# Patient Record
Sex: MALE | Race: WHITE | NOT HISPANIC OR LATINO | Employment: FULL TIME | URBAN - METROPOLITAN AREA
[De-identification: names, ages, dates, MRNs, and addresses within clinical notes are randomized per-mention and may not be internally consistent; named-entity substitution may affect disease eponyms.]

---

## 2018-09-28 ASSESSMENT — MIFFLIN-ST. JEOR: SCORE: 2346.94

## 2018-10-05 ENCOUNTER — SURGERY - HEALTHEAST (OUTPATIENT)
Dept: SURGERY | Facility: CLINIC | Age: 33
End: 2018-10-05

## 2018-10-05 ENCOUNTER — ANESTHESIA - HEALTHEAST (OUTPATIENT)
Dept: SURGERY | Facility: CLINIC | Age: 33
End: 2018-10-05

## 2018-10-05 ASSESSMENT — MIFFLIN-ST. JEOR
SCORE: 2287.97
SCORE: 2287.97

## 2019-12-18 ENCOUNTER — RECORDS - HEALTHEAST (OUTPATIENT)
Dept: ADMINISTRATIVE | Facility: OTHER | Age: 34
End: 2019-12-18

## 2019-12-19 ENCOUNTER — RECORDS - HEALTHEAST (OUTPATIENT)
Dept: ADMINISTRATIVE | Facility: OTHER | Age: 34
End: 2019-12-19

## 2020-01-19 ENCOUNTER — OFFICE VISIT (OUTPATIENT)
Dept: FAMILY MEDICINE | Facility: CLINIC | Age: 35
End: 2020-01-19
Payer: MEDICAID

## 2020-01-19 ENCOUNTER — HOSPITAL ENCOUNTER (EMERGENCY)
Facility: CLINIC | Age: 35
Discharge: HOME | End: 2020-01-19
Attending: NURSE PRACTITIONER | Admitting: NURSE PRACTITIONER
Payer: MEDICAID

## 2020-01-19 VITALS
TEMPERATURE: 98.7 F | OXYGEN SATURATION: 100 % | DIASTOLIC BLOOD PRESSURE: 90 MMHG | WEIGHT: 248 LBS | HEIGHT: 64 IN | SYSTOLIC BLOOD PRESSURE: 120 MMHG | RESPIRATION RATE: 12 BRPM | HEART RATE: 91 BPM | BODY MASS INDEX: 42.34 KG/M2

## 2020-01-19 VITALS
DIASTOLIC BLOOD PRESSURE: 96 MMHG | BODY MASS INDEX: 33.6 KG/M2 | HEIGHT: 71 IN | TEMPERATURE: 98.1 F | SYSTOLIC BLOOD PRESSURE: 143 MMHG | WEIGHT: 240 LBS | HEART RATE: 85 BPM | OXYGEN SATURATION: 99 % | RESPIRATION RATE: 18 BRPM

## 2020-01-19 DIAGNOSIS — E66.01 MORBID OBESITY (H): ICD-10-CM

## 2020-01-19 DIAGNOSIS — S05.02XA ABRASION OF LEFT CORNEA, INITIAL ENCOUNTER: ICD-10-CM

## 2020-01-19 DIAGNOSIS — H57.12 LEFT EYE PAIN: Primary | ICD-10-CM

## 2020-01-19 PROCEDURE — 90471 IMMUNIZATION ADMIN: CPT

## 2020-01-19 PROCEDURE — 99283 EMERGENCY DEPT VISIT LOW MDM: CPT | Mod: 25

## 2020-01-19 PROCEDURE — 99203 OFFICE O/P NEW LOW 30 MIN: CPT | Performed by: PHYSICIAN ASSISTANT

## 2020-01-19 PROCEDURE — 90715 TDAP VACCINE 7 YRS/> IM: CPT | Performed by: NURSE PRACTITIONER

## 2020-01-19 PROCEDURE — 25000125 ZZHC RX 250: Performed by: NURSE PRACTITIONER

## 2020-01-19 PROCEDURE — 25000128 H RX IP 250 OP 636: Performed by: NURSE PRACTITIONER

## 2020-01-19 RX ORDER — PROPARACAINE HYDROCHLORIDE 5 MG/ML
1 SOLUTION/ DROPS OPHTHALMIC ONCE
Status: COMPLETED | OUTPATIENT
Start: 2020-01-19 | End: 2020-01-19

## 2020-01-19 RX ORDER — ERYTHROMYCIN 5 MG/G
0.5 OINTMENT OPHTHALMIC 4 TIMES DAILY
Qty: 1 TUBE | Refills: 0 | Status: SHIPPED | OUTPATIENT
Start: 2020-01-19 | End: 2020-01-24

## 2020-01-19 RX ADMIN — PROPARACAINE HYDROCHLORIDE 1 DROP: 5 SOLUTION/ DROPS OPHTHALMIC at 16:26

## 2020-01-19 RX ADMIN — CLOSTRIDIUM TETANI TOXOID ANTIGEN (FORMALDEHYDE INACTIVATED), CORYNEBACTERIUM DIPHTHERIAE TOXOID ANTIGEN (FORMALDEHYDE INACTIVATED), BORDETELLA PERTUSSIS TOXOID ANTIGEN (GLUTARALDEHYDE INACTIVATED), BORDETELLA PERTUSSIS FILAMENTOUS HEMAGGLUTININ ANTIGEN (FORMALDEHYDE INACTIVATED), BORDETELLA PERTUSSIS PERTACTIN ANTIGEN, AND BORDETELLA PERTUSSIS FIMBRIAE 2/3 ANTIGEN 0.5 ML: 5; 2; 2.5; 5; 3; 5 INJECTION, SUSPENSION INTRAMUSCULAR at 16:27

## 2020-01-19 RX ADMIN — FLUORESCEIN SODIUM 1 STRIP: 1 STRIP OPHTHALMIC at 16:26

## 2020-01-19 ASSESSMENT — MIFFLIN-ST. JEOR
SCORE: 1970.92
SCORE: 2045.76

## 2020-01-19 ASSESSMENT — ENCOUNTER SYMPTOMS: EYE PAIN: 1

## 2020-01-19 NOTE — ED TRIAGE NOTES
Woke up with left eye swelling and pain, vision is blurred, see visual acuity in chart. Most tender on the upper eyelid

## 2020-01-19 NOTE — ED PROVIDER NOTES
"  History     Chief Complaint:  Eye swelling      HPI   Ede Wu is a 35 year old male who presents to the emergency department for evaluation of eye pain. The patient reports that he woke up today with left eye pain and the sensation that something is stuck in his eye. He does not wear contacts but wears glasses and last had a normal eye exam a few months ago. He went to bed normal last night without pain but does remember rubbing his eye. No recent trauma. His last tetanus was 2008.     Allergies:  Haldol     Medications:    Albuterol  Tenormin  Cogentin  Clozaril  Valium  Emgel  Lexapro  Valium  Flonase  Claritin  Perphenazine  MiraLAX  Tetracycline  Topamax  Geodon     Past Medical History:    Acne  Alcohol abuse  Allergic rhinitis  Anxiety  Depression  GERD  Personality disorder  Schizoaffective disorder  Sleep apnea  Snoring   Obesity     Past Surgical History:    Nasal reconstruction  Septoplasty     Family History:    Cancer, unknown    Social History:  Tobacco Use: Some day smoker, quit: 9/30/2005  Alcohol Use: No  PCP: Stiven Arnett  Marital Status:  Unknown      Review of Systems   Eyes: Positive for pain.   All other systems reviewed and are negative.    Physical Exam     Patient Vitals for the past 24 hrs:   BP Temp Temp src Pulse Resp SpO2 Height Weight   01/19/20 1552 (!) 143/96 98.1  F (36.7  C) Oral 85 18 99 % 1.803 m (5' 11\") 108.9 kg (240 lb)     Physical Exam  Nursing notes reviewed. Vitals reviewed.  General: Alert. Well kept.  Eyes:  Visual acuity: right eye - 20/30; left eye - 20/100.  IOP: left eye - 13  right eye: conjunctiva non-injected, non-icteric.  Left eye: Normal conjunctiva. No subconjunctival hemorrhage. No scleral icterus. No foreign body noted on upper eyelid eversion. On slit lamp exam - anterior chamber clear, no hyphema. no foreign body noted in cornea or conjunctiva. Negative Jessika sign. Corneal abrasion at the 7:00 position, does not extend into the central cornea. No " corneal ulcer. No dendrites.   Proptosis-absent  Ptosis-absent  Anisocoria- absent  Neck/Throat: Moist mucous membranes. Normal voice.  Cardiac: Regular rhythm. Normal heart sounds.  Pulmonary: Clear and equal breath sounds bilaterally.   Musculoskeletal: Normal gross range of motion of all 4 extremities.   Neurological: Alert and oriented x4.   Skin: Warm and dry. Normal appearance of visualized exposed skin without rashes or petechiae.  Psych: Affect normal. Good eye contact.    Emergency Department Course   Interventions:  1626 Alcaine 0.5% 1 drop left eye   1626 Ful-Rani 1 strip left eye  1627 Tdap 0.5 mLs IM    Emergency Department Course:  1613 Nursing notes and vitals reviewed. I performed an exam of the patient as documented above.     Medicine administered as documented above.     Findings and plan explained to the Patient. Patient discharged home with instructions regarding supportive care, medications, and reasons to return. The importance of close follow-up was reviewed. The patient was prescribed Romycin.     Impression & Plan    Medical Decision Making:  Ede Wu is a 35 year old male who presents with eye discomfort. The exam is significant for abnormality on fluorescein exam. This is consistent with corneal abrasion. No foreign bodies in eyes or lids noted.  No corneal ulcers. No signs of retinal abnormalities, open globe, acute glaucoma, or other serious eye disease.  No definite signs of anterior chamber involvement such as endopthalmitis at this point.  Will prescribe Romycin. Recommended to follow up with eye clinic and return if worsens.    Diagnosis:    ICD-10-CM    1. Abrasion of left cornea, initial encounter S05.02XA        Disposition:  Discharged to home    Discharge Medications:  New Prescriptions    ERYTHROMYCIN (ROMYCIN) 5 MG/GM OPHTHALMIC OINTMENT    Place 0.5 inches Into the left eye 4 times daily for 5 days     Scribe Disclosure:  Jerald VARGAS, am serving as a scribe on  1/19/2020 at 4:13 PM to personally document services performed by  Bre Rock CNP based on my observations and the provider's statements to me.     Jerald Soliman  1/19/2020    EMERGENCY DEPARTMENT       Bre Rock CNP  01/19/20 2016

## 2020-01-19 NOTE — PROGRESS NOTES
SUBJECTIVE:  Ede is a 35 year old male who presents to urgent care with left eye pain.  He woke up this morning with the pain and feels like there is a foreign body there.  He has difficulty opening up his eye.  He is unsure at the moment if there is any change in his visual acuity because he wears glasses.  He denies photophobia.  His eyes are red.  He denies discharge he denies knowing any specific foreign object that is in there at the moment.  He denies any headache or facial trauma.    Chief Complaint   Patient presents with     Eye Pain     Left eye. Wake up this morning wiht pain in the left eye.      ROS: see HPI    Current Outpatient Medications   Medication     ALBUTEROL IN     atenolol (TENORMIN) 50 MG tablet     benztropine (COGENTIN) 1 MG tablet     clozapine (CLOZARIL) 100 MG tablet     diazepam (VALIUM) 10 MG tablet     erythromycin with ethanol (EMGEL) 2 % gel     escitalopram (LEXAPRO) 20 MG tablet     esomeprazole (NEXIUM) 40 MG capsule     fluticasone (FLONASE) 50 MCG/ACT nasal spray     FLUTICASONE PROPIONATE  HFA IN     loratadine (CLARITIN) 10 MG tablet     perphenazine 4 MG tablet     POLYETHYLENE GLYCOL     tetracycline (ACHROMYCIN,SUMYCIN) 250 MG capsule     topiramate (TOPAMAX) 100 MG tablet     topiramate (TOPAMAX) 25 MG tablet     ziprasidone (GEODON) 40 MG capsule     ziprasidone (GEODON) 80 MG capsule     No current facility-administered medications for this visit.       Patient Active Problem List   Diagnosis     Nasal obstruction        Past Medical History:   Diagnosis Date     Acne hypertension     Alcohol abuse      Allergic rhinitis      Allergic rhinitis, cause unspecified      Anxiety      Broken nose      Depression      Difficulty breathing      GERD (gastroesophageal reflux disease)      Heartburn      Personality disorder (H)     borderline narcissistic and antisocial traits     Schizoaffective disorder     bipolor type     Sleep apnea      Sneezing      Snoring       "Stuffy nose      Weakness      Past Surgical History:   Procedure Laterality Date     nasal reconstruction  2004    broken nose     SEPTOPLASTY  2006, 2008    deviated septum     Family History   Problem Relation Age of Onset     Cancer Unknown         gradfather     Social History     Tobacco Use     Smoking status: Current Some Day Smoker     Last attempt to quit: 2005     Years since quittin.3     Smokeless tobacco: Never Used   Substance Use Topics     Alcohol use: No        OBJECTIVE:  BP (!) 120/90   Pulse 91   Temp 98.7  F (37.1  C) (Oral)   Resp 12   Ht 1.626 m (5' 4\")   Wt 112.5 kg (248 lb)   SpO2 100%   BMI 42.57 kg/m       GENERAL APPEARANCE: healthy, alert and no distress     EYES: EOMI, - PERRL , no photophobia.  Left conjunctive injected, watery discharge, no purulent discharge.  Decreased visual acuity.  No foreign body appreciated on upper or lower inverted eyelids, no periorbital swelling     HENT: ATNC     RESP: No respiratory distress     NEURO: Normal strength and tone, sensory exam grossly normal, mentation intact and speech normal     PSYCH: mentation appears normal. and affect normal/bright             ASSESSMENT/PLAN:  Ede was seen today for eye pain.    Diagnoses and all orders for this visit:    Left eye pain    Morbid obesity (H)      We attempted to flush the eye with saline with no improvement in his symptoms.  No foreign body visualized on exam.  Unable to do anymore examination or work-up of patient due to lack of supplies at this office.  He does have visual acuity changes in his left eye and patient has been advised to go to the ER for further evaluation and treatment.  The plan of care was discussed with the patient. They understand and agree with the course of treatment prescribed. A printed summary was given including instructions and medications.    Willie Orr PA-C  "

## 2020-01-19 NOTE — ED NOTES
Bed: FT02  Expected date:   Expected time:   Means of arrival:   Comments:  Triage   Eye swelling

## 2020-01-19 NOTE — ED AVS SNAPSHOT
Emergency Department  [address] [place] [address]  Phone:  [phone]  Fax:  [phone]                                    Ede Wu   MRN: [id number]    Department:   Emergency Department   Date of Visit:  1/19/2020           After Visit Summary Signature Page    I have received my discharge instructions, and my questions have been answered. I have discussed any challenges I see with this plan with the nurse or doctor.    ..........................................................................................................................................  Patient/Patient Representative Signature      ..........................................................................................................................................  Patient Representative Print Name and Relationship to Patient    ..................................................               ................................................  Date                                   Time    ..........................................................................................................................................  Reviewed by Signature/Title    ...................................................              ..............................................  Date                                               Time          22EPIC Rev 08/18

## 2020-05-18 ENCOUNTER — COMMUNICATION - HEALTHEAST (OUTPATIENT)
Dept: SCHEDULING | Facility: CLINIC | Age: 35
End: 2020-05-18

## 2020-08-07 ENCOUNTER — HOSPITAL ENCOUNTER (EMERGENCY)
Facility: CLINIC | Age: 35
Discharge: HOME | End: 2020-08-07
Payer: MEDICAID

## 2020-08-07 VITALS
TEMPERATURE: 98 F | DIASTOLIC BLOOD PRESSURE: 78 MMHG | HEIGHT: 71 IN | OXYGEN SATURATION: 98 % | RESPIRATION RATE: 16 BRPM | SYSTOLIC BLOOD PRESSURE: 114 MMHG | BODY MASS INDEX: 33.47 KG/M2

## 2020-08-07 NOTE — ED TRIAGE NOTES
Chronic back pain from back injury 8 years ago.  Over the last month the back pain has started again and patient states that flexeril works but he has no more.  Patient also stating that he is having sciatic pain.

## 2020-08-07 NOTE — ED PROVIDER NOTES
"  History     Chief Complaint:  Back Pain      HPI   Ede Wu is a 35 year old male who presents with ***    Allergies:  Haldol    Medications:    Albuterol  Atenolol  Benztropine  Clozapine  Valium  Lexapro  Nexium  Fluticasone  Loratadine  Perphenazine  Tetracycline  Topamax  Ziprasidone     Past Medical History:    Acne alcohol abuse  Allergic rhinitis  Anxiety  Depression  GERD  Personality disorder  Schizoaffective disorder  Sleep apnea  Morbid obesity  Nasal obstruction    Past Surgical History:    Nasal reconstruction  Septoplasty    Family History:    History reviewed. No pertinent family history.     Social History:  Smoking status: some day smoker  Alcohol use: no  Drug use: no  The patient presents to the emergency department ***  PCP: Stiven Arnett  Marital Status:  Unknown     Review of Systems  ***    Physical Exam     Patient Vitals for the past 24 hrs:   BP Temp Temp src Heart Rate Resp SpO2 Height   08/07/20 1819 114/78 98  F (36.7  C) Oral 101 16 98 % 1.803 m (5' 11\")       Physical Exam  ***    Emergency Department Course   ECG:  ***    Imaging:  {Radiographic findings?:460815510::\" \"}  ***    Laboratory:  ***    Procedures:  ***        Interventions:  ***  {Response to meds:595266::\" \"}    Emergency Department Course:  ***       Impression & Plan      {[facility]:420150}    {trauma activation?:564148::\" \"}  CMS Diagnoses: {Sepsis/Septic Shock/Stemi/Stroke:061314::\" \"}       Medical Decision Making:  ***  Critical Care time:  {none or minutes:562291::\"none\"}    Diagnosis:  No diagnosis found.    Disposition:  {discharged to home/discharged to home with.../Admitted to...:211345}    Discharge Medications:  New Prescriptions    No medications on file       Josue Aaron  8/7/2020    EMERGENCY DEPARTMENT    "

## 2021-06-02 VITALS — BODY MASS INDEX: 42.66 KG/M2 | WEIGHT: 298 LBS | HEIGHT: 70 IN

## 2021-06-20 NOTE — ANESTHESIA CARE TRANSFER NOTE
Last vitals: BP at handoff 150/82.  Vitals:    10/05/18 1842   BP:    Pulse: (!) (P) 102   Resp: (P) 22   Temp: (P) 36.9  C (98.4  F)   SpO2: (P) 94%     Patient's level of consciousness is awake  Spontaneous respirations: yes  Maintains airway independently: yes  Dentition unchanged: yes  Oropharynx: oropharynx clear of all foreign objects    QCDR Measures:  ASA# 20 - Surgical Safety Checklist: WHO surgical safety checklist completed prior to induction  PQRS# 430 - Adult PONV Prevention: 4558F - Pt received => 2 anti-emetic agents (different classes) preop & intraop  ASA# 8 - Peds PONV Prevention: NA - Not pediatric patient, not GA or 2 or more risk factors NOT present  PQRS# 424 - Fanny-op Temp Management: 4559F - At least one body temp DOCUMENTED => 35.5C or 95.9F within required timeframe  PQRS# 426 - PACU Transfer Protocol: - Transfer of care checklist used  ASA# 14 - Acute Post-op Pain: ASA14B - Patient did NOT experience pain >= 7 out of 10

## 2021-06-20 NOTE — ANESTHESIA POSTPROCEDURE EVALUATION
Patient: Ede Wu  NASAL VALVE RECONSTRUCTION, BILATERAL SUBMUCOUS RESECTION OF INFERIOR TURBINATES, AND SEPTOPLASTY WITH CADAVER RIB GRAFT SINUS ENDOSCOPY, ENDOSCOPY, NOSE SINUS CAVITY,   Anesthesia type: general    Patient location: PACU  Last vitals:   Vitals:    10/05/18 1916   BP:    Pulse: 93   Resp: 20   Temp:    SpO2: 91%     Post vital signs: stable  Level of consciousness: awake and responds to simple questions  Post-anesthesia pain: pain controlled  Post-anesthesia nausea and vomiting: no  Pulmonary: unassisted, return to baseline  Cardiovascular: stable and blood pressure at baseline  Hydration: adequate  Anesthetic events: no    QCDR Measures:  ASA# 11 - Fanyn-op Cardiac Arrest: ASA11B - Patient did NOT experience unanticipated cardiac arrest  ASA# 12 - Fanny-op Mortality Rate: ASA12B - Patient did NOT die  ASA# 13 - PACU Re-Intubation Rate: ASA13B - Patient did NOT require a new airway mgmt  ASA# 10 - Composite Anes Safety: ASA10A - No serious adverse event    Additional Notes:Sleep apnea protocol ordered.

## 2021-06-20 NOTE — ANESTHESIA PREPROCEDURE EVALUATION
Anesthesia Evaluation      Patient summary reviewed   No history of anesthetic complications     Airway   Mallampati: III  Neck ROM: full   Pulmonary - normal exam   (+) sleep apnea,                          Cardiovascular - normal exam  (+) hypertension, ,     Rhythm: regular  Rate: normal,         Neuro/Psych    (+) anxiety/panic attacks,     Comments: Schizoaffective disorder    Endo/Other    (+) obesity,      GI/Hepatic/Renal    (+) GERD,             Dental - normal exam                        Anesthesia Plan  Planned anesthetic: general endotracheal    ASA 3   Induction: intravenous       Post-op plan: routine recovery

## 2022-02-13 ENCOUNTER — HOSPITAL ENCOUNTER (INPATIENT)
Facility: CLINIC | Age: 37
LOS: 2 days | Discharge: HOME | DRG: 390 | End: 2022-02-15
Attending: EMERGENCY MEDICINE | Admitting: INTERNAL MEDICINE
Payer: MEDICARE

## 2022-02-13 ENCOUNTER — APPOINTMENT (OUTPATIENT)
Dept: ULTRASOUND IMAGING | Facility: CLINIC | Age: 37
DRG: 390 | End: 2022-02-13
Attending: EMERGENCY MEDICINE
Payer: MEDICARE

## 2022-02-13 ENCOUNTER — APPOINTMENT (OUTPATIENT)
Dept: CT IMAGING | Facility: CLINIC | Age: 37
DRG: 390 | End: 2022-02-13
Attending: EMERGENCY MEDICINE
Payer: MEDICARE

## 2022-02-13 DIAGNOSIS — K56.609 SMALL BOWEL OBSTRUCTION (H): ICD-10-CM

## 2022-02-13 LAB
ALBUMIN SERPL-MCNC: 4.9 G/DL (ref 3.4–5)
ALP SERPL-CCNC: 117 U/L (ref 40–150)
ALT SERPL W P-5'-P-CCNC: 21 U/L (ref 0–70)
ANION GAP SERPL CALCULATED.3IONS-SCNC: 11 MMOL/L (ref 3–14)
AST SERPL W P-5'-P-CCNC: 15 U/L (ref 0–45)
ATRIAL RATE - MUSE: 102 BPM
BASOPHILS # BLD AUTO: 0 10E3/UL (ref 0–0.2)
BASOPHILS NFR BLD AUTO: 0 %
BILIRUB SERPL-MCNC: 0.5 MG/DL (ref 0.2–1.3)
BUN SERPL-MCNC: 24 MG/DL (ref 7–30)
CALCIUM SERPL-MCNC: 10.3 MG/DL (ref 8.5–10.1)
CHLORIDE BLD-SCNC: 103 MMOL/L (ref 94–109)
CO2 SERPL-SCNC: 23 MMOL/L (ref 20–32)
CREAT SERPL-MCNC: 0.99 MG/DL (ref 0.66–1.25)
DIASTOLIC BLOOD PRESSURE - MUSE: NORMAL MMHG
EOSINOPHIL # BLD AUTO: 0 10E3/UL (ref 0–0.7)
EOSINOPHIL NFR BLD AUTO: 0 %
ERYTHROCYTE [DISTWIDTH] IN BLOOD BY AUTOMATED COUNT: 13.6 % (ref 10–15)
GFR SERPL CREATININE-BSD FRML MDRD: >90 ML/MIN/1.73M2
GLUCOSE BLD-MCNC: 183 MG/DL (ref 70–99)
GLUCOSE BLDC GLUCOMTR-MCNC: 101 MG/DL (ref 70–99)
GLUCOSE BLDC GLUCOMTR-MCNC: 103 MG/DL (ref 70–99)
HBA1C MFR BLD: 5.5 % (ref 0–5.6)
HCT VFR BLD AUTO: 47.5 % (ref 40–53)
HGB BLD-MCNC: 16.1 G/DL (ref 13.3–17.7)
IMM GRANULOCYTES # BLD: 0.1 10E3/UL
IMM GRANULOCYTES NFR BLD: 1 %
INTERPRETATION ECG - MUSE: NORMAL
LIPASE SERPL-CCNC: 139 U/L (ref 73–393)
LYMPHOCYTES # BLD AUTO: 0.9 10E3/UL (ref 0.8–5.3)
LYMPHOCYTES NFR BLD AUTO: 6 %
MCH RBC QN AUTO: 30.3 PG (ref 26.5–33)
MCHC RBC AUTO-ENTMCNC: 33.9 G/DL (ref 31.5–36.5)
MCV RBC AUTO: 89 FL (ref 78–100)
MONOCYTES # BLD AUTO: 0.6 10E3/UL (ref 0–1.3)
MONOCYTES NFR BLD AUTO: 5 %
NEUTROPHILS # BLD AUTO: 12.5 10E3/UL (ref 1.6–8.3)
NEUTROPHILS NFR BLD AUTO: 88 %
NRBC # BLD AUTO: 0 10E3/UL
NRBC BLD AUTO-RTO: 0 /100
P AXIS - MUSE: 46 DEGREES
PLATELET # BLD AUTO: 187 10E3/UL (ref 150–450)
POTASSIUM BLD-SCNC: 3.6 MMOL/L (ref 3.4–5.3)
PR INTERVAL - MUSE: 178 MS
PROT SERPL-MCNC: 8.5 G/DL (ref 6.8–8.8)
QRS DURATION - MUSE: 94 MS
QT - MUSE: 360 MS
QTC - MUSE: 469 MS
R AXIS - MUSE: 36 DEGREES
RBC # BLD AUTO: 5.32 10E6/UL (ref 4.4–5.9)
SARS-COV-2 RNA RESP QL NAA+PROBE: NEGATIVE
SODIUM SERPL-SCNC: 137 MMOL/L (ref 133–144)
SYSTOLIC BLOOD PRESSURE - MUSE: NORMAL MMHG
T AXIS - MUSE: 17 DEGREES
VENTRICULAR RATE- MUSE: 102 BPM
WBC # BLD AUTO: 14.1 10E3/UL (ref 4–11)

## 2022-02-13 PROCEDURE — 96361 HYDRATE IV INFUSION ADD-ON: CPT

## 2022-02-13 PROCEDURE — 250N000009 HC RX 250: Performed by: INTERNAL MEDICINE

## 2022-02-13 PROCEDURE — 87635 SARS-COV-2 COVID-19 AMP PRB: CPT | Performed by: EMERGENCY MEDICINE

## 2022-02-13 PROCEDURE — 258N000003 HC RX IP 258 OP 636: Performed by: INTERNAL MEDICINE

## 2022-02-13 PROCEDURE — 76705 ECHO EXAM OF ABDOMEN: CPT

## 2022-02-13 PROCEDURE — 250N000011 HC RX IP 250 OP 636: Performed by: EMERGENCY MEDICINE

## 2022-02-13 PROCEDURE — 250N000011 HC RX IP 250 OP 636: Performed by: INTERNAL MEDICINE

## 2022-02-13 PROCEDURE — 93005 ELECTROCARDIOGRAM TRACING: CPT

## 2022-02-13 PROCEDURE — 36415 COLL VENOUS BLD VENIPUNCTURE: CPT | Performed by: EMERGENCY MEDICINE

## 2022-02-13 PROCEDURE — 250N000013 HC RX MED GY IP 250 OP 250 PS 637: Performed by: EMERGENCY MEDICINE

## 2022-02-13 PROCEDURE — 96375 TX/PRO/DX INJ NEW DRUG ADDON: CPT

## 2022-02-13 PROCEDURE — 258N000003 HC RX IP 258 OP 636: Performed by: EMERGENCY MEDICINE

## 2022-02-13 PROCEDURE — C9803 HOPD COVID-19 SPEC COLLECT: HCPCS

## 2022-02-13 PROCEDURE — 99222 1ST HOSP IP/OBS MODERATE 55: CPT | Performed by: SURGERY

## 2022-02-13 PROCEDURE — 83036 HEMOGLOBIN GLYCOSYLATED A1C: CPT | Performed by: INTERNAL MEDICINE

## 2022-02-13 PROCEDURE — 96374 THER/PROPH/DIAG INJ IV PUSH: CPT | Mod: 59

## 2022-02-13 PROCEDURE — 99285 EMERGENCY DEPT VISIT HI MDM: CPT | Mod: 25

## 2022-02-13 PROCEDURE — 250N000009 HC RX 250: Performed by: EMERGENCY MEDICINE

## 2022-02-13 PROCEDURE — 99223 1ST HOSP IP/OBS HIGH 75: CPT | Mod: AI | Performed by: INTERNAL MEDICINE

## 2022-02-13 PROCEDURE — 80053 COMPREHEN METABOLIC PANEL: CPT | Performed by: EMERGENCY MEDICINE

## 2022-02-13 PROCEDURE — 85025 COMPLETE CBC W/AUTO DIFF WBC: CPT | Performed by: EMERGENCY MEDICINE

## 2022-02-13 PROCEDURE — C9113 INJ PANTOPRAZOLE SODIUM, VIA: HCPCS | Performed by: EMERGENCY MEDICINE

## 2022-02-13 PROCEDURE — 120N000001 HC R&B MED SURG/OB

## 2022-02-13 PROCEDURE — 82040 ASSAY OF SERUM ALBUMIN: CPT | Performed by: EMERGENCY MEDICINE

## 2022-02-13 PROCEDURE — 74177 CT ABD & PELVIS W/CONTRAST: CPT

## 2022-02-13 PROCEDURE — 83690 ASSAY OF LIPASE: CPT | Performed by: EMERGENCY MEDICINE

## 2022-02-13 PROCEDURE — 250N000013 HC RX MED GY IP 250 OP 250 PS 637: Performed by: INTERNAL MEDICINE

## 2022-02-13 RX ORDER — FINASTERIDE 5 MG/1
1.25 TABLET, FILM COATED ORAL DAILY
COMMUNITY

## 2022-02-13 RX ORDER — HYDROXYZINE HYDROCHLORIDE 25 MG/1
25 TABLET, FILM COATED ORAL 3 TIMES DAILY PRN
COMMUNITY

## 2022-02-13 RX ORDER — PANTOPRAZOLE SODIUM 40 MG/1
40 TABLET, DELAYED RELEASE ORAL DAILY
COMMUNITY

## 2022-02-13 RX ORDER — NALOXONE HYDROCHLORIDE 0.4 MG/ML
0.4 INJECTION, SOLUTION INTRAMUSCULAR; INTRAVENOUS; SUBCUTANEOUS
Status: DISCONTINUED | OUTPATIENT
Start: 2022-02-13 | End: 2022-02-15 | Stop reason: HOSPADM

## 2022-02-13 RX ORDER — BISACODYL 10 MG
10 SUPPOSITORY, RECTAL RECTAL DAILY PRN
Status: DISCONTINUED | OUTPATIENT
Start: 2022-02-13 | End: 2022-02-15 | Stop reason: HOSPADM

## 2022-02-13 RX ORDER — SODIUM CHLORIDE 9 MG/ML
INJECTION, SOLUTION INTRAVENOUS CONTINUOUS
Status: DISCONTINUED | OUTPATIENT
Start: 2022-02-13 | End: 2022-02-13

## 2022-02-13 RX ORDER — LIDOCAINE HYDROCHLORIDE 20 MG/ML
10 JELLY TOPICAL ONCE
Status: COMPLETED | OUTPATIENT
Start: 2022-02-13 | End: 2022-02-13

## 2022-02-13 RX ORDER — GEMFIBROZIL 600 MG/1
600 TABLET, FILM COATED ORAL
COMMUNITY

## 2022-02-13 RX ORDER — HYDROMORPHONE HCL IN WATER/PF 6 MG/30 ML
0.2 PATIENT CONTROLLED ANALGESIA SYRINGE INTRAVENOUS
Status: DISCONTINUED | OUTPATIENT
Start: 2022-02-13 | End: 2022-02-15 | Stop reason: HOSPADM

## 2022-02-13 RX ORDER — FAMOTIDINE 20 MG/1
20 TABLET, FILM COATED ORAL 2 TIMES DAILY
COMMUNITY

## 2022-02-13 RX ORDER — NALOXONE HYDROCHLORIDE 0.4 MG/ML
0.2 INJECTION, SOLUTION INTRAMUSCULAR; INTRAVENOUS; SUBCUTANEOUS
Status: DISCONTINUED | OUTPATIENT
Start: 2022-02-13 | End: 2022-02-15 | Stop reason: HOSPADM

## 2022-02-13 RX ORDER — OXYMETAZOLINE HYDROCHLORIDE 0.05 G/100ML
2 SPRAY NASAL ONCE
Status: COMPLETED | OUTPATIENT
Start: 2022-02-13 | End: 2022-02-13

## 2022-02-13 RX ORDER — BUSPIRONE HYDROCHLORIDE 30 MG/1
30 TABLET ORAL 2 TIMES DAILY
COMMUNITY

## 2022-02-13 RX ORDER — DIAZEPAM 10 MG/2ML
2.5 INJECTION, SOLUTION INTRAMUSCULAR; INTRAVENOUS EVERY 4 HOURS PRN
Status: DISCONTINUED | OUTPATIENT
Start: 2022-02-13 | End: 2022-02-15 | Stop reason: HOSPADM

## 2022-02-13 RX ORDER — SODIUM CHLORIDE 9 MG/ML
INJECTION, SOLUTION INTRAVENOUS CONTINUOUS
Status: DISCONTINUED | OUTPATIENT
Start: 2022-02-13 | End: 2022-02-15

## 2022-02-13 RX ORDER — ONDANSETRON 2 MG/ML
4 INJECTION INTRAMUSCULAR; INTRAVENOUS EVERY 30 MIN PRN
Status: DISCONTINUED | OUTPATIENT
Start: 2022-02-13 | End: 2022-02-13

## 2022-02-13 RX ORDER — CETIRIZINE HYDROCHLORIDE 10 MG/1
10 TABLET ORAL AT BEDTIME
COMMUNITY

## 2022-02-13 RX ORDER — OLANZAPINE 5 MG/1
5 TABLET, ORALLY DISINTEGRATING ORAL 3 TIMES DAILY PRN
Status: DISCONTINUED | OUTPATIENT
Start: 2022-02-13 | End: 2022-02-15 | Stop reason: HOSPADM

## 2022-02-13 RX ORDER — ONDANSETRON 2 MG/ML
4 INJECTION INTRAMUSCULAR; INTRAVENOUS EVERY 6 HOURS PRN
Status: DISCONTINUED | OUTPATIENT
Start: 2022-02-13 | End: 2022-02-15 | Stop reason: HOSPADM

## 2022-02-13 RX ORDER — IOPAMIDOL 755 MG/ML
121 INJECTION, SOLUTION INTRAVASCULAR ONCE
Status: COMPLETED | OUTPATIENT
Start: 2022-02-13 | End: 2022-02-13

## 2022-02-13 RX ORDER — SENNOSIDES A AND B 8.6 MG/1
2 TABLET, FILM COATED ORAL AT BEDTIME
COMMUNITY

## 2022-02-13 RX ORDER — ERGOCALCIFEROL 1.25 MG/1
50000 CAPSULE, LIQUID FILLED ORAL WEEKLY
COMMUNITY

## 2022-02-13 RX ORDER — LIDOCAINE 40 MG/G
CREAM TOPICAL
Status: DISCONTINUED | OUTPATIENT
Start: 2022-02-13 | End: 2022-02-15 | Stop reason: HOSPADM

## 2022-02-13 RX ORDER — ONDANSETRON 4 MG/1
4 TABLET, ORALLY DISINTEGRATING ORAL EVERY 6 HOURS PRN
Status: DISCONTINUED | OUTPATIENT
Start: 2022-02-13 | End: 2022-02-15 | Stop reason: HOSPADM

## 2022-02-13 RX ADMIN — SODIUM CHLORIDE: 9 INJECTION, SOLUTION INTRAVENOUS at 19:48

## 2022-02-13 RX ADMIN — SODIUM CHLORIDE 1000 ML: 9 INJECTION, SOLUTION INTRAVENOUS at 07:32

## 2022-02-13 RX ADMIN — IOPAMIDOL 121 ML: 755 INJECTION, SOLUTION INTRAVENOUS at 08:19

## 2022-02-13 RX ADMIN — SODIUM CHLORIDE 75 ML: 900 INJECTION INTRAVENOUS at 08:20

## 2022-02-13 RX ADMIN — OXYMETAZOLINE HYDROCHLORIDE 2 SPRAY: 0.05 SPRAY NASAL at 09:32

## 2022-02-13 RX ADMIN — LIDOCAINE HYDROCHLORIDE 10 ML: 20 JELLY TOPICAL at 09:34

## 2022-02-13 RX ADMIN — HYDROMORPHONE HYDROCHLORIDE 0.2 MG: 0.2 INJECTION, SOLUTION INTRAMUSCULAR; INTRAVENOUS; SUBCUTANEOUS at 12:30

## 2022-02-13 RX ADMIN — FAMOTIDINE 20 MG: 10 INJECTION, SOLUTION INTRAVENOUS at 21:09

## 2022-02-13 RX ADMIN — Medication 1 MG: at 21:09

## 2022-02-13 RX ADMIN — SODIUM CHLORIDE: 9 INJECTION, SOLUTION INTRAVENOUS at 11:17

## 2022-02-13 RX ADMIN — PANTOPRAZOLE SODIUM 40 MG: 40 INJECTION, POWDER, FOR SOLUTION INTRAVENOUS at 07:44

## 2022-02-13 RX ADMIN — FAMOTIDINE 20 MG: 10 INJECTION, SOLUTION INTRAVENOUS at 12:19

## 2022-02-13 RX ADMIN — DIAZEPAM 2.5 MG: 10 INJECTION, SOLUTION INTRAMUSCULAR; INTRAVENOUS at 17:10

## 2022-02-13 RX ADMIN — LIDOCAINE HYDROCHLORIDE 30 ML: 20 SOLUTION ORAL; TOPICAL at 09:32

## 2022-02-13 RX ADMIN — SODIUM CHLORIDE 1000 ML: 9 INJECTION, SOLUTION INTRAVENOUS at 06:53

## 2022-02-13 RX ADMIN — ONDANSETRON 4 MG: 2 INJECTION INTRAMUSCULAR; INTRAVENOUS at 07:31

## 2022-02-13 ASSESSMENT — ACTIVITIES OF DAILY LIVING (ADL)
ADLS_ACUITY_SCORE: 8
ADLS_ACUITY_SCORE: 5
DIFFICULTY_EATING/SWALLOWING: OTHER (SEE COMMENTS)
ADLS_ACUITY_SCORE: 5
DIFFICULTY_COMMUNICATING: NO
DRESSING/BATHING_DIFFICULTY: NO
TOILETING_ISSUES: NO
ADLS_ACUITY_SCORE: 5

## 2022-02-13 ASSESSMENT — ENCOUNTER SYMPTOMS
ABDOMINAL PAIN: 1
VOMITING: 1
DIARRHEA: 0
NAUSEA: 1
FEVER: 0
COUGH: 0
CONSTIPATION: 1

## 2022-02-13 NOTE — ED NOTES
[facility]  ED Nurse Handoff Report    ED Chief complaint: Nausea & Vomiting (Nausea/vomiting started at 1800 then generalized abd pain. No diarrhea.)      ED Diagnosis:   Final diagnoses:   None       Code Status: To be addressed by admitting MD.     Allergies:   Allergies   Allergen Reactions     Dust Mites      Haldol [Butyrophenones]      Unknown [No Clinical Screening - See Comments]      christina       Patient Story: Pt presents to the ED for evaluation of generalized abdominal pain. Pt reports nausea and vomiting.   Focused Assessment:    Neuro: alert and oriented x4  Cardiac: pt denies chest pain.   Respiratory: respirations even and unlabored.   GI: nausea, pt reports emesis PTA, pt reports acid reflux, SBO seen on CT scan    Treatments and/or interventions provided:   CT Abdomen Pelvis w Contrast   Final Result   IMPRESSION:       1.  Small bowel obstruction. The transition point is difficult to visualize, though may be in the right lower quadrant.      2.  Fluid distention involves the stomach and proximal small bowel, extending into pelvic small bowel.      3.  No free fluid or air. No abscess.            US Abdomen Limited (RUQ)   Final Result   IMPRESSION:   1.  Incomplete visualization of the fundus of the gallbladder secondary to shadowing off of the patient's ribs. No evidence to suggest cholelithiasis.                Patient's response to treatments and/or interventions: Tolerated appropriately     To be done/followed up on inpatient unit:  Further evaluation and hospitalization     Does this patient have any cognitive concerns?: NA    Activity level - Baseline/Home:  Independent  Activity Level - Current:   Independent    Patient's Preferred language: English   Needed?: No    Isolation: None  Infection: Not Applicable  Patient tested for COVID 19 prior to admission: YES  Bariatric?: No    Vital Signs:   Vitals:    02/13/22 0755 02/13/22 0800 02/13/22 0815 02/13/22 0830    BP:       Pulse:       Resp:       Temp:       TempSrc:       SpO2: 99% 100% 100% 100%       Cardiac Rhythm:     Was the PSS-3 completed:   Yes  What interventions are required if any?               Family Comments: Pt to update family as he sees appropriately.   OBS brochure/video discussed/provided to patient/family: N/A              Name of person given brochure if not patient: NA              Relationship to patient: NA     For the majority of the shift this patient's behavior was Green.   Behavioral interventions performed were frequent rounding in ED.    ED NURSE PHONE NUMBER: [id number]

## 2022-02-13 NOTE — CONSULTS
[facility]  General Surgery Consultation         Stiven Lerma MD, MD    Ede Wu MRN# [id number]   YOB: 1985 Age: 37 year old      Date of Admission:  2/13/2022  Date of Consult: 2/13/2022         Assessment and Plan:   Pleasant 37-year-old gentleman with a de jeremiah small bowel obstruction.  No history of previous abdominal surgeries.  Agree with admission for pain management, fluid resuscitation, and NG tube decompression.  Most patients with bowel obstruction will clinically improve and not require surgery.  Given the fact that this is the patient's second Denovo bowel obstruction, however, consideration may be given toward exploratory laparoscopy, whether during this admission or as an outpatient.  We will reassess in the morning and discuss with the patient further.  Surgical co-morbities include acne, alcohol abuse, depression, GERD, schizoaffective disorder.            Code Status:   Full Code         Primary Care Physician:   No Ref-Primary, Physician None         Requesting Physician:      Dr. Guerra         Chief Complaint:   Small bowel obstruction    History is obtained from the patient         History of Present Illness:     Ede Wu is a 37 year old male who presents with nausea and vomiting and abdominal pain.  He indicates that shortly before he had his symptoms started last evening around 6 PM, he did eat nachos with cheese.  Then at about 6 PM, he started having nausea and vomiting along with some generalized abdominal pain.  He denies any diarrhea and in fact states that he feels constipated.  However he chronically is constipated, and this is currently unchanged.  The abdominal discomfort has now radiated into his chest a little bit and he feels like he has some heartburn.  He denies any fever or cough, and he denies any urinary symptoms.  He has no history of abdominal surgeries.  No recent travel.  He does feel somewhat better after receiving ODT  Zofran.  Of note, patient recalls a history of previous bowel obstruction managed in [facility] 6 to 8 years ago.  He recalls being in the hospital for 10 days but did not require surgery.  Patient has also had a previous colonoscopy for bloody stools, but this was unremarkable except for hemorrhoids.           Past Medical History:   Ede Wu  has a past medical history of Acne (hypertension), Alcohol abuse, Allergic rhinitis, Allergic rhinitis, cause unspecified, Anxiety, Broken nose, Depression, Difficulty breathing, GERD (gastroesophageal reflux disease), Heartburn, Personality disorder (H), Schizoaffective disorder, Sleep apnea, Sneezing, Snoring, Stuffy nose, and Weakness.         Past Surgical History:   Ede Wu  has a past surgical history that includes nasal reconstruction (2004); Septoplasty (2006, 2008); Nose surgery; Pr Repair Nasal Cavity Stenosis (N/A, 10/5/2018); and sinus surgery (N/A, 10/5/2018).         Home Medications:     Prior to Admission medications    Medication Sig Last Dose Taking? Auth Provider   ALBUTEROL IN Inhale 2 puffs into the lungs every 4 hours as needed.   Reported, Patient   atenolol (TENORMIN) 50 MG tablet Take 1 tablet by mouth. Am and pm   Reported, Patient   benztropine (COGENTIN) 1 MG tablet Take 1 mg by mouth 3 times daily.   Reported, Patient   busPIRone HCl (BUSPAR) 30 MG tablet Take 15 mg by mouth 2 times daily   Unknown, Entered By History   cetirizine (ZYRTEC) 10 MG tablet Take 10 mg by mouth daily   Unknown, Entered By History   clozapine (CLOZARIL) 100 MG tablet Take 100 mg by mouth. 1 am, 2 noon, 6 pm   Reported, Patient   diazepam (VALIUM) 10 MG tablet Take 10 mg by mouth 2 times daily as needed.   Reported, Patient   erythromycin with ethanol (EMGEL) 2 % gel Apply  topically 2 times daily as needed.   Reported, Patient   escitalopram (LEXAPRO) 20 MG tablet Take 0.5 tablets by mouth daily.   Reported, Patient   esomeprazole (NEXIUM) 40 MG capsule  Take 40 mg by mouth every morning (before breakfast). One hour before meals   Reported, Patient   famotidine (PEPCID) 20 MG tablet Take 20 mg by mouth 2 times daily   Unknown, Entered By History   finasteride (PROSCAR) 5 MG tablet Take 5 mg by mouth daily   Unknown, Entered By History   fluticasone (FLONASE) 50 MCG/ACT nasal spray 2 sprays by Both Nostrils route 2 times daily.   Reported, Patient   FLUTICASONE PROPIONATE  HFA IN Inhale  into the lungs 2 times daily.   Reported, Patient   gemfibrozil (LOPID) 600 MG tablet Take 600 mg by mouth 2 times daily (before meals)   Unknown, Entered By History   hydrOXYzine (ATARAX) 25 MG tablet Take 25 mg by mouth 3 times daily as needed for itching   Unknown, Entered By History   loratadine (CLARITIN) 10 MG tablet Take 10 mg by mouth daily.   Reported, Patient   metFORMIN (GLUCOPHAGE) 1000 MG tablet Take 1,000 mg by mouth 2 times daily (with meals)   Unknown, Entered By History   pantoprazole (PROTONIX) 40 MG EC tablet Take 40 mg by mouth daily   Unknown, Entered By History   perphenazine 4 MG tablet Take 4 mg by mouth as needed.   Reported, Patient   POLYETHYLENE GLYCOL As directed   Reported, Patient   senna (SENOKOT) 8.6 MG tablet Take 1 tablet by mouth daily   Unknown, Entered By History   tetracycline (ACHROMYCIN,SUMYCIN) 250 MG capsule Take 250 mg by mouth 2 times daily.   Reported, Patient   topiramate (TOPAMAX) 100 MG tablet Take 100 mg by mouth At Bedtime.   Reported, Patient   topiramate (TOPAMAX) 25 MG tablet Take 25 mg by mouth. Noon and 5 pm   Reported, Patient   vitamin D2 (ERGOCALCIFEROL) 42544 units (1250 mcg) capsule Take 50,000 Units by mouth once a week   Unknown, Entered By History   ziprasidone (GEODON) 40 MG capsule Take 2 capsules by mouth daily.   Reported, Patient   ziprasidone (GEODON) 80 MG capsule Take 80 mg by mouth 3 times daily.   Reported, Patient            Current Medications:           famotidine  20 mg Intravenous BID     sodium chloride  (PF)  3 mL Intracatheter Q8H     bisacodyl, HYDROmorphone, lidocaine 4%, lidocaine (buffered or not buffered), melatonin, naloxone **OR** naloxone **OR** naloxone **OR** naloxone, ondansetron **OR** ondansetron, sodium chloride (PF)         Allergies:     Allergies   Allergen Reactions     Dust Mites      Haldol [Butyrophenones]      Unknown [No Clinical Screening - See Comments]      prolixen            Social History:   Ede Wu  reports that he has been smoking. He has never used smokeless tobacco. He reports that he does not drink alcohol.          Family History:   Ede Wu family history includes Cancer in an other family member.          Review of Systems:   The 10 point Review of Systems is negative other than noted in the HPI.  Nausea as described.           Physical Exam:   Blood pressure 119/79, pulse 102, temperature 97.4  F (36.3  C), temperature source Oral, resp. rate 16, SpO2 98 %.  0 lbs 0 oz    Pleasant gentleman in no distress.  Patient has a pleasant affect and communicates well.   Pupils equal round and reactive to light.   No cervical lymphadenopathy or thyromegaly.   Lung fields clear, breathing comfortably.   Heart normal sinus rhythm.  No murmurs rubs or gallops.  Abdomen soft, nontender, nondistended.  Mild generalized pain, no peritoneal signs or rebound.  No surgical scars.  Small umbilical hernia.  Skin warm, dry.  No obvious rashes or lesions.           Data:   All new lab and imaging data was reviewed.  This includes personal review of his CT scan images, which reveals a moderate small bowel obstruction with a suggestion of transition to decompressed bowel in the mid abdomen.  No obvious mechanical transition point.  Recent Labs   Lab Test 02/13/22  0525 10/05/18  2026   WBC 14.1* 11.0   HGB 16.1 13.9*   MCV 89 92    123*      Recent Labs   Lab Test 02/13/22 0525      POTASSIUM 3.6   CHLORIDE 103   CO2 23   BUN 24   CR 0.99   ANIONGAP 11   MAGALI 10.3*   *

## 2022-02-13 NOTE — H&P
[facility]  Hospitalist History and Physical    Name: Ede Wu    MRN: [id number]  YOB: 1985    Age: 37 year old  Date of Admission:  2/13/2022  Physician:  Rk Guerra DO, [facility]    Assessment & Plan   Ede Wu is a 37 year old male who presented to the [facility] ER with abdominal discomfort and intractable vomiting.    Small bowel obstruction:  -  No prior history of ABD surgery.  Reportedly had a bowel obstruction several years ago that responded to bowel rest.  In the ED after imaging he had an NGT placed.  He still has nausea but is feeling better.  Prior to this episode he overall has had stable bowel habits except periodic constipation.  His last BM recently was 3-4 days ago.  He also prior had some rectal bleeding and a colon scope which he reports attributed the bleeding to hemorrhoids.  He has not had recent bleeding.  -  Will continue NGT to LIS.  Will keep on IVF and keep NPO.  Anti-emetics PRN.  I have asked general surgery to consult.    Bipolar/Schizoaffecive disorder/Personality Disorder:   -  Chronically on multiple mental health meds.  He feels mental health status stable lately.  Unfortunately given his GI complaints he could not take his meds this AM and needs to remain off the oral meds until NGT can be clamped/he improves.  Will order some zyprexa ODT PRN if breakthrough anxiety/agitation.  Will also order some valium IV PRN for anxiety.  If not able to start oral meds by tomorrow consider psychiatry consult to assist in finding best non-oral balance of medications for his mental health issues.    Hypertension:  -  Hold oral agents.  BP currently controlled (119 systolic) without them.  If BP trending up would consider adding lopressor IV scheduled until oral can be taken.      Reflux:  -  Pepcid and PPI IV    CELINA:  -  CPAP as tolerated    Hyperglycemia:  -  Lab glu 180.  Will check A1C and order some glu monitoring.  Hold on subcut insulin orders unless  "consistently high.    DVT Prophylaxis: Pneumatic Compression Devices  (Consider adding lovenox subcut tomorrow if not improving getting more mobile)  Code Status: Full Code    Disposition: Expect 2+ night hospital stay.    Primary Care Physician   Physician No Ref-Primary    Chief Complaint   History is obtained from the patient  I also spoke with the ER provider about the history.       History of Present Illness   Ede Wu is a 37 year old male who presents with worsening nausea/emesis and abdominal discomfort.  He felt his normal self yesterday besides being \"a little constipated.\"  He ate some nachos in the mid to late afternoon. By 6PM he had a diffuse central abdominal to epigastric region ache.  He then developed emesis and had at least 6 episodes of vomiting overnight.  Emesis was non-bloody.  He denies stools.  His ABD was more distended and remained uncomfortable.  He presented to the ED and was noted on imaging to have a probable bowel obstruction.  He reports a more mild episode several years ago.  No chest pain, sob, fevers, other new complaints.  He reports he is compliant with his meds and last took them/kept them down yesterday AM.    Past Medical History    Past Medical History:   Diagnosis Date     Acne hypertension     Alcohol abuse      Allergic rhinitis      Allergic rhinitis, cause unspecified      Anxiety      Broken nose      Depression      Difficulty breathing      GERD (gastroesophageal reflux disease)      Heartburn      Personality disorder (H)     borderline narcissistic and antisocial traits     Schizoaffective disorder     bipolor type     Sleep apnea      Sneezing      Snoring      Stuffy nose      Weakness      Past Surgical History   Past Surgical History:   Procedure Laterality Date     nasal reconstruction  2004    broken nose     NOSE SURGERY      several septoplasties and nasal reconstruction     PA REPAIR NASAL CAVITY STENOSIS N/A 10/5/2018    Procedure: NASAL VALVE " RECONSTRUCTION, BILATERAL SUBMUCOUS RESECTION OF INFERIOR TURBINATES, AND SEPTOPLASTY WITH CADAVER RIB GRAFT SINUS ENDOSCOPY;  Surgeon: Freddy Tillman MD;  Location: [facility] OR;  Service: ENT     SEPTOPLASTY  ,     deviated septum     SINUS SURGERY N/A 10/5/2018    Procedure: ENDOSCOPY, NOSE SINUS CAVITY, ;  Surgeon: Freddy Tillman MD;  Location: [facility];  Service:         Prior to Admission Medications   Prior to Admission Medications   Prescriptions Last Dose Informant Patient Reported? Taking?   ALBUTEROL IN   Yes No   Sig: Inhale 2 puffs into the lungs every 4 hours as needed.   FLUTICASONE PROPIONATE  HFA IN   Yes No   Sig: Inhale  into the lungs 2 times daily.   POLYETHYLENE GLYCOL   Yes No   Sig: As directed   atenolol (TENORMIN) 50 MG tablet   Yes No   Sig: Take 1 tablet by mouth. Am and pm   benztropine (COGENTIN) 1 MG tablet   Yes No   Sig: Take 1 mg by mouth 3 times daily.   busPIRone HCl (BUSPAR) 30 MG tablet   Yes No   Sig: Take 15 mg by mouth 2 times daily   cetirizine (ZYRTEC) 10 MG tablet   Yes No   Sig: Take 10 mg by mouth daily   clozapine (CLOZARIL) 100 MG tablet   Yes No   Sig: Take 100 mg by mouth. 1 am, 2 noon, 6 pm   diazepam (VALIUM) 10 MG tablet   Yes No   Sig: Take 10 mg by mouth 2 times daily as needed.   erythromycin with ethanol (EMGEL) 2 % gel   Yes No   Sig: Apply  topically 2 times daily as needed.   escitalopram (LEXAPRO) 20 MG tablet   Yes No   Sig: Take 0.5 tablets by mouth daily.   esomeprazole (NEXIUM) 40 MG capsule   Yes No   Sig: Take 40 mg by mouth every morning (before breakfast). One hour before meals   famotidine (PEPCID) 20 MG tablet   Yes No   Sig: Take 20 mg by mouth 2 times daily   finasteride (PROSCAR) 5 MG tablet   Yes No   Sig: Take 5 mg by mouth daily   fluticasone (FLONASE) 50 MCG/ACT nasal spray   Yes No   Si sprays by Both Nostrils route 2 times daily.   gemfibrozil (LOPID) 600 MG tablet   Yes No   Sig: Take 600 mg by mouth  2 times daily (before meals)   hydrOXYzine (ATARAX) 25 MG tablet   Yes No   Sig: Take 25 mg by mouth 3 times daily as needed for itching   loratadine (CLARITIN) 10 MG tablet   Yes No   Sig: Take 10 mg by mouth daily.   metFORMIN (GLUCOPHAGE) 1000 MG tablet   Yes No   Sig: Take 1,000 mg by mouth 2 times daily (with meals)   pantoprazole (PROTONIX) 40 MG EC tablet   Yes No   Sig: Take 40 mg by mouth daily   perphenazine 4 MG tablet   Yes No   Sig: Take 4 mg by mouth as needed.   senna (SENOKOT) 8.6 MG tablet   Yes No   Sig: Take 1 tablet by mouth daily   tetracycline (ACHROMYCIN,SUMYCIN) 250 MG capsule   Yes No   Sig: Take 250 mg by mouth 2 times daily.   topiramate (TOPAMAX) 100 MG tablet   Yes No   Sig: Take 100 mg by mouth At Bedtime.   topiramate (TOPAMAX) 25 MG tablet   Yes No   Sig: Take 25 mg by mouth. Noon and 5 pm   vitamin D2 (ERGOCALCIFEROL) 93118 units (1250 mcg) capsule   Yes No   Sig: Take 50,000 Units by mouth once a week   ziprasidone (GEODON) 40 MG capsule   Yes No   Sig: Take 2 capsules by mouth daily.   ziprasidone (GEODON) 80 MG capsule   Yes No   Sig: Take 80 mg by mouth 3 times daily.      Facility-Administered Medications: None     Allergies   Allergies   Allergen Reactions     Dust Mites      Haldol [Butyrophenones]      Unknown [No Clinical Screening - See Comments]      prolixen       Social History   Social History     Tobacco Use     Smoking status: Current Some Day Smoker     Last attempt to quit: 2005     Years since quittin.3     Smokeless tobacco: Never Used   Substance Use Topics     Alcohol use: No   Works at [employer] as [employer].  Baseline active.      Family History   Denies GI issues in immediate family.  Family History   Problem Relation Age of Onset     Cancer Other         gradfather       Review of Systems   A Comprehensive greater than 10 system review of systems was carried out.  Pertinent positives and negatives are noted above.  Otherwise negative for  contributory information.    Physical Exam   Temp: 97.4  F (36.3  C) Temp src: Oral BP: 119/79 Pulse: 102   Resp: 16 SpO2: 98 % O2 Device: None (Room air)    Vital Signs with Ranges  Temp:  [97.4  F (36.3  C)-98.8  F (37.1  C)] 97.4  F (36.3  C)  Pulse:  [101-120] 102  Resp:  [13-28] 16  BP: (115-135)/(79-95) 119/79  SpO2:  [96 %-100 %] 98 %  0 lbs 0 oz    GEN:  Alert, oriented x 3, appears ill but comfortable, no overt distress  HEENT:  Normocephalic/atraumatic, NGT in place with brown output, no scleral icterus, no nasal discharge, mouth moist.  CV:  Regular rate and rhythm, no murmur or JVD.  S1 + S2 noted, no S3 or S4.  LUNGS:  Clear to auscultation bilaterally without rales/rhonchi/wheezing/retractions.  Symmetric chest rise on inhalation noted.  ABD:  Very hypoactive bowel sounds, soft, mild epigastric tenderness, mildly distended.  No guarding/rigidity.  EXT:  No edema.  No cyanosis.  No acute joint synovitis noted.  SKIN:  Dry to touch, no exanthems noted in the visualized areas.  NEURO:  Moves extremities well on general exam, sensation to touch grossly intact.  No new focal deficits appreciated.      Data   Data reviewed today:  I personally reviewed no images or EKG's today.    Recent Labs   Lab 02/13/22  0525   WBC 14.1*   HGB 16.1   HCT 47.5   MCV 89        Recent Labs   Lab 02/13/22  0525      POTASSIUM 3.6   CHLORIDE 103   CO2 23   ANIONGAP 11   *   BUN 24   CR 0.99   GFRESTIMATED >90   MAGALI 10.3*   PROTTOTAL 8.5   ALBUMIN 4.9   BILITOTAL 0.5   ALKPHOS 117   AST 15   ALT 21       Recent Results (from the past 24 hour(s))   US Abdomen Limited (RUQ)    Narrative    EXAM: US ABDOMEN LIMITED  LOCATION: [facility]  DATE/TIME: 2/13/2022 6:57 AM    INDICATION: abd pain, n v  COMPARISON: None.  TECHNIQUE: Limited abdominal ultrasound.    FINDINGS:    GALLBLADDER: The gallbladder fundus is partially obscured by shadowing off of the patient's ribs. No definite  shadowing stones are seen in the gallbladder. The wall of the gallbladder measures 3-4 mm. The patient was not focally tender over the   gallbladder. No pericholecystic fluid is seen.    BILE DUCTS: No intrahepatic biliary dilatation. The common duct is not seen secondary to overlying bowel gas.    LIVER: The left lobe of the liver is incompletely seen secondary to shadowing off of the patient's ribs and adjacent bowel gas. Visualized portions of the liver are homogeneous without focal mass. Smooth hepatic contour. The liver measures 16.5 cm in   length.    RIGHT KIDNEY: The right kidney measures 12 cm. Renal cortical thickness measurement is 1.6 cm. No hydronephrosis.    PANCREAS: The pancreas is largely obscured by overlying gas.    No ascites.      Impression    IMPRESSION:  1.  Incomplete visualization of the fundus of the gallbladder secondary to shadowing off of the patient's ribs. No evidence to suggest cholelithiasis.       CT Abdomen Pelvis w Contrast    Narrative    EXAM: CT ABDOMEN PELVIS W CONTRAST  LOCATION: [facility]  DATE/TIME: 2/13/2022 8:07 AM    INDICATION: Abdominal pain, acute, nonlocalized  COMPARISON: None.  TECHNIQUE: CT scan of the abdomen and pelvis was performed following injection of IV contrast. Multiplanar reformats were obtained. Dose reduction techniques were used.  CONTRAST: 121mL Isovue 370    FINDINGS:   LOWER CHEST: Normal.    HEPATOBILIARY: Mild hepatic steatosis. Decompressed gallbladder without obvious opaque stones. No biliary dictation.    PANCREAS: Normal.    SPLEEN: Normal.    ADRENAL GLANDS: Normal.    KIDNEYS/BLADDER: Normal.    BOWEL: Fluid-filled distended stomach without wall thickening. Fluid distention extends into small bowel beginning at the proximal duodenum and extending through pelvic small bowel; the transition point is difficult, though may be in the right lower   quadrant located between two loops of mildly dilated bowel (series 4,  image 125). Small bowel measures up to roughly 5.3 cm. Normal appendix.    LYMPH NODES: No adenopathy.    VASCULATURE: Nonaneurysmal aorta.    PELVIC ORGANS: Unremarkable.    MUSCULOSKELETAL: Small fat-containing periumbilical hernia.      Impression    IMPRESSION:     1.  Small bowel obstruction. The transition point is difficult to visualize, though may be in the right lower quadrant.    2.  Fluid distention involves the stomach and proximal small bowel, extending into pelvic small bowel.    3.  No free fluid or air. No abscess.

## 2022-02-13 NOTE — PROGRESS NOTES
"[facility]  Progress Note    2227    Visit with Ede per  request.    He had a pretty flat affect but welcomed me in to the room with a \"praise God\".      I explained [facility] services to him and he asked for prayer that he wouldn't have to have surgery for his obstruction.      I let him know that [facility] remains available for him during his stay.      Aminata Bynum    Pager [phone]  "

## 2022-02-13 NOTE — PLAN OF CARE
DATE & TIME: 2/13/2022   Cognitive Concerns/ Orientation : AOx4   BEHAVIOR & AGGRESSION TOOL COLOR: Green  ABNL VS/O2: VSS on RA  MOBILITY: Independent  PAIN MANAGMENT: IV dilaudid q2h  DIET: NPO, ice chips  BOWEL/BLADDER: Continent  ABNL LAB/BG: WBC 14.1,   DRAIN/DEVICES: NG on intermittent suction, L PIV infusing 125mL/hr  TELEMETRY RHYTHM: N/A  TESTS/PROCEDURES: Pending NG output  D/C DAY/GOALS/PLACE: Discharge pending

## 2022-02-13 NOTE — ED PROVIDER NOTES
History     Chief Complaint:  Nausea & Vomiting     The history is provided by the patient.      Ede Wu is a 37 year old male who presents with nausea and vomiting and abdominal pain.  He indicates that shortly before he had his symptoms started last evening around 6 PM, he did eat nachos with cheese.  Then at about 6 PM, he started having nausea and vomiting along with some generalized abdominal pain.  He denies any diarrhea and in fact states that he feels constipated.  However he chronically is constipated, and this is currently unchanged.  The abdominal discomfort has now radiated into his chest a little bit and he feels like he has some heartburn.  He denies any fever or cough, and he denies any urinary symptoms.  He has no history of abdominal surgeries.  No recent travel.  He does feel somewhat better after receiving ODT Zofran.    ROS:  Review of Systems   Constitutional: Negative for fever.   Respiratory: Negative for cough.    Cardiovascular: Positive for chest pain.   Gastrointestinal: Positive for abdominal pain, constipation, nausea and vomiting. Negative for diarrhea.   Genitourinary: Negative.      Allergies:  Dust Mites  Haldol [Butyrophenones]  Benzyl Alcohol  Fluphenazine  Haloperidol  Phenothiazines  Sesame Seed    Medications:    albuterol  atenolol   benztropine   clozapine   diazepam   escitalopram  Esomeprazole  Fluticasone propionate  loratadine   perphenazine   Polyethylene glycol  tetracycline  topiramate   ziprasidone    Past Medical History:    Acne   Alcohol abuse   Allergic rhinitis   Anxiety   Broken nose   Depression    GERD  Heartburn   Personality disorder  Schizoaffective disorder   Sleep apnea   Nasal obstruction  Morbid obesity     Past Surgical History:    nasal reconstruction  Nose surgery  Pr repair nasal cavity stenosis  Septoplasty  Sinus surgery   [place] teeth extraction    Family History:    The patient denies having any family history.    Social History:  The  patient presents alone.  The patient reports that he does not drink alcohol.    Physical Exam     Patient Vitals for the past 24 hrs:   BP Temp Temp src Pulse Resp SpO2   02/13/22 0915 -- -- -- -- -- 98 %   02/13/22 0900 -- -- -- -- -- 100 %   02/13/22 0845 -- -- -- -- -- 99 %   02/13/22 0830 -- -- -- -- -- 100 %   02/13/22 0815 -- -- -- -- -- 100 %   02/13/22 0800 -- -- -- -- -- 100 %   02/13/22 0755 -- -- -- -- -- 99 %   02/13/22 0754 -- -- -- -- -- 99 %   02/13/22 0753 -- -- -- -- -- 100 %   02/13/22 0751 -- -- -- -- -- 99 %   02/13/22 0750 -- -- -- -- -- 99 %   02/13/22 0734 -- -- -- 104 16 97 %   02/13/22 0600 115/86 -- -- 103 15 96 %   02/13/22 0545 (!) 131/94 -- -- 105 13 96 %   02/13/22 0544 -- -- -- 101 24 97 %   02/13/22 0542 (!) 135/95 -- -- 103 28 98 %   02/13/22 0510 130/88 98.8  F (37.1  C) Oral 120 18 98 %     Physical Exam  Nursing note and vitals reviewed.  Constitutional:  Oriented to person, place, and time. Cooperative.   HENT:   Nose:    Nose normal.   Mouth/Throat:   Facemask in place.   Eyes:    Conjunctivae normal and EOM are normal.      Pupils are equal, round, and reactive to light.   Neck:    Trachea normal.   Cardiovascular:  Tachycardic rate, regular rhythm, normal heart sounds and normal pulses. No murmur heard.  Pulmonary/Chest:  Effort normal and breath sounds normal.   Abdominal:   Soft. Normal appearance and bowel sounds are normal.      There is mild diffuse tenderness to palpation with maximal tenderness in the right upper quadrant region.   There is no rebound and no CVA tenderness.   Musculoskeletal:  Extremities atraumatic x 4.   Lymphadenopathy:  No cervical adenopathy.   Neurological:   Alert and oriented to person, place, and time. Normal strength.      No cranial nerve deficit or sensory deficit. GCS eye subscore is 4. GCS verbal subscore is 5. GCS motor subscore is 6.   Skin:    Skin is intact. No rash noted.   Psychiatric:   Normal mood and affect.    Emergency  Department Course     ECG:  ECG taken at 0600, ECG read at 0603  Sinus tachycardia  Otherwise normal ECG   No prior EKG to compare to.  Rate 102 bpm. IA interval 178 ms. QRS duration 94 ms. QT/QTc 360/469 ms. P-R-T axes 46 36 17.     Imaging:  CT Abdomen Pelvis w Contrast   Final Result   IMPRESSION:       1.  Small bowel obstruction. The transition point is difficult to visualize, though may be in the right lower quadrant.      2.  Fluid distention involves the stomach and proximal small bowel, extending into pelvic small bowel.      3.  No free fluid or air. No abscess.            US Abdomen Limited (RUQ)   Final Result   IMPRESSION:   1.  Incomplete visualization of the fundus of the gallbladder secondary to shadowing off of the patient's ribs. No evidence to suggest cholelithiasis.               Report per radiology    Laboratory:  Labs Ordered and Resulted from Time of ED Arrival to Time of ED Departure   COMPREHENSIVE METABOLIC PANEL - Abnormal       Result Value    Sodium 137      Potassium 3.6      Chloride 103      Carbon Dioxide (CO2) 23      Anion Gap 11      Urea Nitrogen 24      Creatinine 0.99      Calcium 10.3 (*)     Glucose 183 (*)     Alkaline Phosphatase 117      AST 15      ALT 21      Protein Total 8.5      Albumin 4.9      Bilirubin Total 0.5      GFR Estimate >90     CBC WITH PLATELETS AND DIFFERENTIAL - Abnormal    WBC Count 14.1 (*)     RBC Count 5.32      Hemoglobin 16.1      Hematocrit 47.5      MCV 89      MCH 30.3      MCHC 33.9      RDW 13.6      Platelet Count 187      % Neutrophils 88      % Lymphocytes 6      % Monocytes 5      % Eosinophils 0      % Basophils 0      % Immature Granulocytes 1      NRBCs per 100 WBC 0      Absolute Neutrophils 12.5 (*)     Absolute Lymphocytes 0.9      Absolute Monocytes 0.6      Absolute Eosinophils 0.0      Absolute Basophils 0.0      Absolute Immature Granulocytes 0.1      Absolute NRBCs 0.0     LIPASE - Normal    Lipase 139     COVID-19 VIRUS  (CORONAVIRUS) BY PCR     Emergency Department Course:  Reviewed:  I reviewed nursing notes, vitals, past medical history, Care Everywhere and [facility]    Assessments:  0632 I obtained history and examined the patient as noted above.   0728 I rechecked the patient and explained findings.  0856 I rechecked the patient.  0918 I rechecked the patient again.    Consultations:  0905 I consulted with Dr. Pressley from General Surgery.    Interventions:  0653 0.9% sodium chloride BOLUS, IV  0731 ondansetron (ZOFRAN) injection 4 mg, PO  0732 0.9% sodium chloride BOLUS, PO  0744 pantoprazole (PROTONIX) IV push injection 40 mg, PO    Disposition:  The patient was admitted to the hospital under the care of Dr. Guerra.     Impression & Plan    Medical Decision Making:  This is a 37-year-old male who came in for further evaluation of nausea and vomiting and abdominal pain.  Even though he was feeling better upon my evaluation, I was concerned that he could have something serious going on such as biliary colic, cholecystitis, biliary obstruction, or possibly pancreatitis, appendicitis, diverticulitis, colitis, bowel obstruction, or possibly gastroenteritis.  I proceeded with the above work-up including the blood work and ultrasound of his gallbladder initially.  The ultrasound does not appear to show anything acute, but his WBC came back elevated.  I then proceeded with a CT scan to further evaluate his symptoms.  He appears to have a small bowel obstruction as a cause of his symptoms.  Since he has not previously had any abdominal surgeries, I spoke with the on-call general surgeon, Dr. Lerma, who will see the patient in consultation.  We subsequently placed an NG tube.  I then spoke with Dr. Guerra, who will be admitting him.    Diagnosis:    ICD-10-CM    1. Small bowel obstruction (H)  K56.609           Scribe Disclosure:  I, Rosalie Gonzales, am serving as a scribe at 7:13 AM on 2/13/2022 to document services personally performed by  Alex Freed MD based on my observations and the provider's statements to me.       Alex Freed MD  02/13/22 1008

## 2022-02-13 NOTE — LETTER
[facility]   [address] [facility] [address]  [phone]          February 15, 2022    RE:  Ede Wu                                                                                                                                                       [address]    [facility] [address]            To whom it may concern:    Ede Wu was under my professional care and was admitted to the hospital on 02/13/2022 and was discharged on 02/15/2022. He can return to work on 02/16/2022 with no restrictions.         Sincerely,        Jovana Vila MD

## 2022-02-13 NOTE — PROGRESS NOTES
RECEIVING UNIT ED HANDOFF REVIEW    ED Nurse Handoff Report was reviewed by: Ash Verdugo RN on February 13, 2022 at 10:10 AM

## 2022-02-14 ENCOUNTER — ANESTHESIA EVENT (OUTPATIENT)
Dept: MEDSURG UNIT | Facility: CLINIC | Age: 37
DRG: 390 | End: 2022-02-14
Payer: MEDICARE

## 2022-02-14 ENCOUNTER — ANESTHESIA (OUTPATIENT)
Dept: MEDSURG UNIT | Facility: CLINIC | Age: 37
DRG: 390 | End: 2022-02-14
Payer: MEDICARE

## 2022-02-14 LAB
ANION GAP SERPL CALCULATED.3IONS-SCNC: 5 MMOL/L (ref 3–14)
BASOPHILS # BLD AUTO: 0 10E3/UL (ref 0–0.2)
BASOPHILS NFR BLD AUTO: 0 %
BUN SERPL-MCNC: 24 MG/DL (ref 7–30)
CALCIUM SERPL-MCNC: 8.3 MG/DL (ref 8.5–10.1)
CHLORIDE BLD-SCNC: 113 MMOL/L (ref 94–109)
CO2 SERPL-SCNC: 22 MMOL/L (ref 20–32)
CREAT SERPL-MCNC: 0.99 MG/DL (ref 0.66–1.25)
EOSINOPHIL # BLD AUTO: 0 10E3/UL (ref 0–0.7)
EOSINOPHIL NFR BLD AUTO: 0 %
ERYTHROCYTE [DISTWIDTH] IN BLOOD BY AUTOMATED COUNT: 13.7 % (ref 10–15)
GFR SERPL CREATININE-BSD FRML MDRD: >90 ML/MIN/1.73M2
GLUCOSE BLD-MCNC: 101 MG/DL (ref 70–99)
GLUCOSE BLDC GLUCOMTR-MCNC: 112 MG/DL (ref 70–99)
GLUCOSE BLDC GLUCOMTR-MCNC: 86 MG/DL (ref 70–99)
GLUCOSE BLDC GLUCOMTR-MCNC: 86 MG/DL (ref 70–99)
GLUCOSE BLDC GLUCOMTR-MCNC: 87 MG/DL (ref 70–99)
GLUCOSE BLDC GLUCOMTR-MCNC: 93 MG/DL (ref 70–99)
GLUCOSE BLDC GLUCOMTR-MCNC: 99 MG/DL (ref 70–99)
HCT VFR BLD AUTO: 40.8 % (ref 40–53)
HGB BLD-MCNC: 13.4 G/DL (ref 13.3–17.7)
IMM GRANULOCYTES # BLD: 0 10E3/UL
IMM GRANULOCYTES NFR BLD: 0 %
LYMPHOCYTES # BLD AUTO: 1.7 10E3/UL (ref 0.8–5.3)
LYMPHOCYTES NFR BLD AUTO: 25 %
MCH RBC QN AUTO: 31.2 PG (ref 26.5–33)
MCHC RBC AUTO-ENTMCNC: 32.8 G/DL (ref 31.5–36.5)
MCV RBC AUTO: 95 FL (ref 78–100)
MONOCYTES # BLD AUTO: 0.4 10E3/UL (ref 0–1.3)
MONOCYTES NFR BLD AUTO: 6 %
NEUTROPHILS # BLD AUTO: 4.6 10E3/UL (ref 1.6–8.3)
NEUTROPHILS NFR BLD AUTO: 69 %
PLATELET # BLD AUTO: 108 10E3/UL (ref 150–450)
POTASSIUM BLD-SCNC: 3.4 MMOL/L (ref 3.4–5.3)
RBC # BLD AUTO: 4.3 10E6/UL (ref 4.4–5.9)
SODIUM SERPL-SCNC: 140 MMOL/L (ref 133–144)
WBC # BLD AUTO: 6.8 10E3/UL (ref 4–11)
WBC # BLD AUTO: 6.8 10E3/UL (ref 4–11)

## 2022-02-14 PROCEDURE — 999N000141 HC STATISTIC PRE-PROCEDURE NURSING ASSESSMENT: Performed by: SURGERY

## 2022-02-14 PROCEDURE — 250N000013 HC RX MED GY IP 250 OP 250 PS 637: Performed by: HOSPITALIST

## 2022-02-14 PROCEDURE — 258N000003 HC RX IP 258 OP 636: Performed by: ANESTHESIOLOGY

## 2022-02-14 PROCEDURE — 36415 COLL VENOUS BLD VENIPUNCTURE: CPT | Performed by: INTERNAL MEDICINE

## 2022-02-14 PROCEDURE — 250N000009 HC RX 250: Performed by: INTERNAL MEDICINE

## 2022-02-14 PROCEDURE — 99232 SBSQ HOSP IP/OBS MODERATE 35: CPT | Performed by: SURGERY

## 2022-02-14 PROCEDURE — 258N000003 HC RX IP 258 OP 636: Performed by: INTERNAL MEDICINE

## 2022-02-14 PROCEDURE — 120N000001 HC R&B MED SURG/OB

## 2022-02-14 PROCEDURE — 82310 ASSAY OF CALCIUM: CPT | Performed by: INTERNAL MEDICINE

## 2022-02-14 PROCEDURE — C9113 INJ PANTOPRAZOLE SODIUM, VIA: HCPCS | Performed by: INTERNAL MEDICINE

## 2022-02-14 PROCEDURE — 85027 COMPLETE CBC AUTOMATED: CPT | Performed by: INTERNAL MEDICINE

## 2022-02-14 PROCEDURE — 99232 SBSQ HOSP IP/OBS MODERATE 35: CPT | Performed by: HOSPITALIST

## 2022-02-14 PROCEDURE — 250N000011 HC RX IP 250 OP 636: Performed by: INTERNAL MEDICINE

## 2022-02-14 RX ORDER — CLOZAPINE 200 MG/1
600 TABLET ORAL AT BEDTIME
Status: DISCONTINUED | OUTPATIENT
Start: 2022-02-14 | End: 2022-02-15 | Stop reason: HOSPADM

## 2022-02-14 RX ORDER — BUSPIRONE HYDROCHLORIDE 15 MG/1
30 TABLET ORAL 2 TIMES DAILY
Status: DISCONTINUED | OUTPATIENT
Start: 2022-02-14 | End: 2022-02-15 | Stop reason: HOSPADM

## 2022-02-14 RX ORDER — FENTANYL CITRATE 50 UG/ML
50 INJECTION, SOLUTION INTRAMUSCULAR; INTRAVENOUS EVERY 5 MIN PRN
Status: CANCELLED | OUTPATIENT
Start: 2022-02-14

## 2022-02-14 RX ORDER — HYDROXYZINE HYDROCHLORIDE 25 MG/1
25 TABLET, FILM COATED ORAL 3 TIMES DAILY PRN
Status: DISCONTINUED | OUTPATIENT
Start: 2022-02-14 | End: 2022-02-15 | Stop reason: HOSPADM

## 2022-02-14 RX ORDER — FINASTERIDE 5 MG/1
1.25 TABLET, FILM COATED ORAL DAILY
Status: DISCONTINUED | OUTPATIENT
Start: 2022-02-14 | End: 2022-02-15 | Stop reason: HOSPADM

## 2022-02-14 RX ORDER — SODIUM CHLORIDE, SODIUM LACTATE, POTASSIUM CHLORIDE, CALCIUM CHLORIDE 600; 310; 30; 20 MG/100ML; MG/100ML; MG/100ML; MG/100ML
INJECTION, SOLUTION INTRAVENOUS CONTINUOUS
Status: CANCELLED | OUTPATIENT
Start: 2022-02-14

## 2022-02-14 RX ORDER — SODIUM CHLORIDE 9 MG/ML
INJECTION, SOLUTION INTRAVENOUS CONTINUOUS
Status: DISCONTINUED | OUTPATIENT
Start: 2022-02-14 | End: 2022-02-14 | Stop reason: CLARIF

## 2022-02-14 RX ORDER — CEFAZOLIN SODIUM/WATER 2 G/20 ML
2 SYRINGE (ML) INTRAVENOUS SEE ADMIN INSTRUCTIONS
Status: DISCONTINUED | OUTPATIENT
Start: 2022-02-14 | End: 2022-02-14 | Stop reason: HOSPADM

## 2022-02-14 RX ORDER — ALBUTEROL SULFATE 0.83 MG/ML
2.5 SOLUTION RESPIRATORY (INHALATION) EVERY 4 HOURS PRN
Status: CANCELLED | OUTPATIENT
Start: 2022-02-14

## 2022-02-14 RX ORDER — CETIRIZINE HYDROCHLORIDE 10 MG/1
10 TABLET ORAL AT BEDTIME
Status: DISCONTINUED | OUTPATIENT
Start: 2022-02-14 | End: 2022-02-15 | Stop reason: HOSPADM

## 2022-02-14 RX ORDER — CEFAZOLIN SODIUM/WATER 2 G/20 ML
2 SYRINGE (ML) INTRAVENOUS
Status: DISCONTINUED | OUTPATIENT
Start: 2022-02-14 | End: 2022-02-14 | Stop reason: HOSPADM

## 2022-02-14 RX ORDER — MEPERIDINE HYDROCHLORIDE 25 MG/ML
12.5 INJECTION INTRAMUSCULAR; INTRAVENOUS; SUBCUTANEOUS EVERY 5 MIN PRN
Status: CANCELLED | OUTPATIENT
Start: 2022-02-14

## 2022-02-14 RX ORDER — FLUTICASONE PROPIONATE 50 MCG
2 SPRAY, SUSPENSION (ML) NASAL 2 TIMES DAILY
Status: DISCONTINUED | OUTPATIENT
Start: 2022-02-14 | End: 2022-02-15 | Stop reason: HOSPADM

## 2022-02-14 RX ORDER — SODIUM CHLORIDE, SODIUM LACTATE, POTASSIUM CHLORIDE, CALCIUM CHLORIDE 600; 310; 30; 20 MG/100ML; MG/100ML; MG/100ML; MG/100ML
INJECTION, SOLUTION INTRAVENOUS CONTINUOUS
Status: DISCONTINUED | OUTPATIENT
Start: 2022-02-14 | End: 2022-02-14 | Stop reason: HOSPADM

## 2022-02-14 RX ORDER — HYDROMORPHONE HCL IN WATER/PF 6 MG/30 ML
0.4 PATIENT CONTROLLED ANALGESIA SYRINGE INTRAVENOUS EVERY 5 MIN PRN
Status: CANCELLED | OUTPATIENT
Start: 2022-02-14

## 2022-02-14 RX ORDER — ONDANSETRON 2 MG/ML
4 INJECTION INTRAMUSCULAR; INTRAVENOUS EVERY 30 MIN PRN
Status: CANCELLED | OUTPATIENT
Start: 2022-02-14

## 2022-02-14 RX ORDER — SENNOSIDES 8.6 MG
2 TABLET ORAL AT BEDTIME
Status: DISCONTINUED | OUTPATIENT
Start: 2022-02-14 | End: 2022-02-15 | Stop reason: HOSPADM

## 2022-02-14 RX ORDER — ALBUTEROL SULFATE 90 UG/1
2 AEROSOL, METERED RESPIRATORY (INHALATION) EVERY 4 HOURS PRN
Status: ON HOLD | COMMUNITY
End: 2022-02-15

## 2022-02-14 RX ORDER — ZIPRASIDONE HYDROCHLORIDE 80 MG/1
80 CAPSULE ORAL DAILY
Status: DISCONTINUED | OUTPATIENT
Start: 2022-02-14 | End: 2022-02-15 | Stop reason: HOSPADM

## 2022-02-14 RX ORDER — ALBUTEROL SULFATE 90 UG/1
2 AEROSOL, METERED RESPIRATORY (INHALATION) EVERY 4 HOURS PRN
Status: DISCONTINUED | OUTPATIENT
Start: 2022-02-14 | End: 2022-02-15 | Stop reason: HOSPADM

## 2022-02-14 RX ORDER — ONDANSETRON 4 MG/1
4 TABLET, ORALLY DISINTEGRATING ORAL EVERY 30 MIN PRN
Status: CANCELLED | OUTPATIENT
Start: 2022-02-14

## 2022-02-14 RX ORDER — OXYCODONE HYDROCHLORIDE 5 MG/1
5 TABLET ORAL EVERY 4 HOURS PRN
Status: CANCELLED | OUTPATIENT
Start: 2022-02-14

## 2022-02-14 RX ORDER — ESCITALOPRAM OXALATE 20 MG/1
20 TABLET ORAL DAILY
Status: DISCONTINUED | OUTPATIENT
Start: 2022-02-14 | End: 2022-02-15 | Stop reason: HOSPADM

## 2022-02-14 RX ORDER — GEMFIBROZIL 600 MG/1
600 TABLET, FILM COATED ORAL
Status: DISCONTINUED | OUTPATIENT
Start: 2022-02-14 | End: 2022-02-15 | Stop reason: HOSPADM

## 2022-02-14 RX ORDER — CLOZAPINE 200 MG/1
600 TABLET ORAL AT BEDTIME
COMMUNITY

## 2022-02-14 RX ADMIN — SODIUM CHLORIDE, POTASSIUM CHLORIDE, SODIUM LACTATE AND CALCIUM CHLORIDE: 600; 310; 30; 20 INJECTION, SOLUTION INTRAVENOUS at 10:01

## 2022-02-14 RX ADMIN — CETIRIZINE HYDROCHLORIDE 10 MG: 10 TABLET, FILM COATED ORAL at 21:06

## 2022-02-14 RX ADMIN — DIAZEPAM 2.5 MG: 10 INJECTION, SOLUTION INTRAMUSCULAR; INTRAVENOUS at 10:55

## 2022-02-14 RX ADMIN — FAMOTIDINE 20 MG: 10 INJECTION, SOLUTION INTRAVENOUS at 07:46

## 2022-02-14 RX ADMIN — ONDANSETRON 4 MG: 4 TABLET, ORALLY DISINTEGRATING ORAL at 21:36

## 2022-02-14 RX ADMIN — BUSPIRONE HYDROCHLORIDE 30 MG: 15 TABLET ORAL at 14:06

## 2022-02-14 RX ADMIN — CLOZAPINE 300 MG: 200 TABLET ORAL at 14:33

## 2022-02-14 RX ADMIN — FAMOTIDINE 20 MG: 10 INJECTION, SOLUTION INTRAVENOUS at 21:11

## 2022-02-14 RX ADMIN — ZIPRASIDONE HCL 80 MG: 80 CAPSULE ORAL at 14:33

## 2022-02-14 RX ADMIN — DIATRIZOATE MEGLUMINE AND DIATRIZOATE SODIUM 90 ML: 660; 100 SOLUTION ORAL; RECTAL at 11:49

## 2022-02-14 RX ADMIN — GEMFIBROZIL 600 MG: 600 TABLET ORAL at 16:01

## 2022-02-14 RX ADMIN — HYDROXYZINE HYDROCHLORIDE 25 MG: 25 TABLET ORAL at 14:06

## 2022-02-14 RX ADMIN — SODIUM CHLORIDE: 9 INJECTION, SOLUTION INTRAVENOUS at 04:08

## 2022-02-14 RX ADMIN — CLOZAPINE 600 MG: 200 TABLET ORAL at 21:06

## 2022-02-14 RX ADMIN — PANTOPRAZOLE SODIUM 40 MG: 40 INJECTION, POWDER, FOR SOLUTION INTRAVENOUS at 07:40

## 2022-02-14 RX ADMIN — ESCITALOPRAM OXALATE 20 MG: 20 TABLET ORAL at 14:34

## 2022-02-14 ASSESSMENT — ACTIVITIES OF DAILY LIVING (ADL)
ADLS_ACUITY_SCORE: 5
ADLS_ACUITY_SCORE: 7
ADLS_ACUITY_SCORE: 9
ADLS_ACUITY_SCORE: 9
ADLS_ACUITY_SCORE: 7
ADLS_ACUITY_SCORE: 7
ADLS_ACUITY_SCORE: 9
ADLS_ACUITY_SCORE: 7
ADLS_ACUITY_SCORE: 7
ADLS_ACUITY_SCORE: 5
ADLS_ACUITY_SCORE: 5
ADLS_ACUITY_SCORE: 7
ADLS_ACUITY_SCORE: 9
ADLS_ACUITY_SCORE: 9
ADLS_ACUITY_SCORE: 7
ADLS_ACUITY_SCORE: 5
ADLS_ACUITY_SCORE: 5
ADLS_ACUITY_SCORE: 7
ADLS_ACUITY_SCORE: 5

## 2022-02-14 ASSESSMENT — MIFFLIN-ST. JEOR: SCORE: 1987.13

## 2022-02-14 NOTE — OR NURSING
Pt was up to BR just now and reported having a fairly large, dry, brown BM. Surgeon will be made aware.

## 2022-02-14 NOTE — PLAN OF CARE
Pt A/O x4. VSS on RA. NG tube to LIS with green output. NPO. Infusing  ml/hr. Denies nausea/vomit. No complain of pain this shift. Took melatonin at bedtime.  of mental health, psych meds on hold. Blood sugar monitor. Sleeps between care. Up independent in room. Continue to monitor

## 2022-02-14 NOTE — PROGRESS NOTES
Surgery    Patient had a large BM in pre-op. Still not feeling great but does not feel he would like to go forward with surgery today. Will cancel and perform a contrast challenge with AXR in AM. Ok for clears for now. Acute care surgery will follow.

## 2022-02-14 NOTE — PLAN OF CARE
DATE & TIME: 2/14/2022 [id number]   Cognitive Concerns/ Orientation : AOx4  BEHAVIOR & AGGRESSION TOOL COLOR: Yellow, Pt was pacing up and down halls.  Pt had anxiety over receiving home meds until given.  ABNL VS/O2: VSS on RA  MOBILITY: Ind  PAIN MANAGMENT: Denies  DIET: Clear liquid  BOWEL/BLADDER: Continent  ABNL LAB/BG: N/A  DRAIN/DEVICES: NG tube clamped  TELEMETRY RHYTHM: N/A  TESTS/PROCEDURES: Gastrograffen test started 1200.  NG clamped unless PT experiences nausea or vomiting.  Xray scheduled 2/15 0600.  D/C DAY/GOALS/PLACE: Discharge pending

## 2022-02-14 NOTE — PROGRESS NOTES
[facility]    Medicine Progress Note - Hospitalist Service    Date of Admission:  2/13/2022    Assessment & Plan          1) small bowel obstruction-improving  -On exam he does not have any significant tenderness or distention  -He was seen by general surgery this morning and that initial plan was to do diagnostic laparoscopy but he had a big bowel movement and the patient was started on clear liquid diet and NG tube will be continued    2) bipolar/schizoaffective disorder/personality disorder  -Patient has been started on clear liquid diet along with NG tube and I have ordered his home medications and we will monitor him closely    3) obesity  -He does have a BMI of more than 30 and was educated on the harmful effects of obesity and was advised to do aerobic exercise for 30 minutes times per week and low-fat and low-calorie diet    4) hypercalcemia-resolved  -His calcium is on the lower side at 8.3 this morning and we will monitor closely recheck the levels in the morning    5) hypertension  -His blood pressure has been stable and we will continue to monitor the patient closely and if it is high he can restart his home medications    6) obstructive sleep apnea  -We will continue the patient with CPAP as tolerated    7) reflux  -We will continue the patient with Protonix         Diet: Clear Liquid Diet    DVT Prophylaxis: Pneumatic Compression Devices.  Patient is ambulatory at this time  Peters Catheter: Not present  Central Lines: None  Cardiac Monitoring: None  Code Status: Full Code      Disposition Plan   Expected Discharge: 02/15/2022     Anticipated discharge location:  Awaiting care coordination huddle  Delays:            The patient's care was discussed with the Bedside Nurse and Patient.    Jovana Vila MD  Hospitalist Service  [facility]  Securely message with the Vocera Web Console (learn more here)  Text page via [employer] Paging/Directory  "        Clinically Significant Risk Factors Present on Admission          # Hypercalcemia: Ca = 10.3 mg/dL (Ref range: 8.5 - 10.1 mg/dL) and/or iCa = N/A on admission, will monitor as appropriate        # Obesity: Estimated body mass index is 31.98 kg/m  as calculated from the following:    Height as of this encounter: 1.803 m (5' 11\").    Weight as of this encounter: 104 kg (229 lb 4.5 oz).      ______________________________________________________________________    Interval History     I did went to see the patient this morning and he was in the preop area and then I saw him later in the day and initial plan was to do diagnostic laparoscopy but patient had good bowel movement and it was aborted and he was started on clear liquid diet.  When I saw the patient he mentioned that he is very anxious and wants his psychiatric medications to be given and they have been ordered.  He denies any nausea, shortness of breath or any chest pain or any suicidal ideation but does have some abdominal discomfort      Data reviewed today: I reviewed all medications, new labs and imaging results over the last 24 hours. I personally reviewed the abdominal CT image(s) showing SBO.    Physical Exam   Vital Signs: Temp: (!) 96  F (35.6  C) Temp src: Oral BP: 113/71 Pulse: 79   Resp: 12 SpO2: 99 % O2 Device: None (Room air)    Weight: 229 lbs 4.45 oz    GEN: He is alert oriented x3 and is not in any distress but was anxious  HEENT:  Normocephalic/atraumatic, NGT in place with brown output, no scleral icterus, no nasal discharge, mouth moist.  CV:  Regular rate and rhythm, no murmur or JVD.  S1 + S2 are normal.  LUNGS:   There is no rales/rhonchi/wheezing/retractions.    And lungs clear to auscultation  ABD:  faint bowel sounds, soft, mild epigastric tenderness, mildly distended.  No guarding/rigidity.  EXT:  No edema.  No cyanosis.  No acute joint synovitis noted.  SKIN: No rash is appreciated.  NEURO:  Moves extremities well on general " exam, sensation to touch grossly intact.    No facial droop  Musculoskeletal: Normal range of motion over upper and lower extremities bilaterally  Lines : peripheral iv and NG tube      Data   Recent Labs   Lab 02/14/22  1121 02/14/22  0825 02/14/22  0817 02/13/22  1456 02/13/22  0525   WBC  --  6.8  6.8  --   --  14.1*   HGB  --  13.4  --   --  16.1   MCV  --  95  --   --  89   PLT  --  108*  --   --  187   NA  --  140  --   --  137   POTASSIUM  --  3.4  --   --  3.6   CHLORIDE  --  113*  --   --  103   CO2  --  22  --   --  23   BUN  --  24  --   --  24   CR  --  0.99  --   --  0.99   ANIONGAP  --  5  --   --  11   MAGALI  --  8.3*  --   --  10.3*   GLC 87 101* 99   < > 183*   ALBUMIN  --   --   --   --  4.9   PROTTOTAL  --   --   --   --  8.5   BILITOTAL  --   --   --   --  0.5   ALKPHOS  --   --   --   --  117   ALT  --   --   --   --  21   AST  --   --   --   --  15   LIPASE  --   --   --   --  139    < > = values in this interval not displayed.     No results found for this or any previous visit (from the past 24 hour(s)).  Medications     sodium chloride 125 mL/hr at 02/14/22 1100       busPIRone HCl  30 mg Oral BID     cetirizine  10 mg Oral At Bedtime     cloZAPine  300 mg Oral QAM     cloZAPine  600 mg Oral At Bedtime     escitalopram  20 mg Oral Daily     famotidine  20 mg Intravenous BID     finasteride  1.25 mg Oral Daily     fluticasone  2 spray Both Nostrils BID     gemfibrozil  600 mg Oral BID AC     pantoprazole (PROTONIX) IV  40 mg Intravenous Daily with breakfast     sennosides  2 tablet Oral At Bedtime     sodium chloride (PF)  3 mL Intracatheter Q8H     ziprasidone  80 mg Oral Daily

## 2022-02-14 NOTE — OR NURSING
Surgery cancelled per pt discussion with Surgeon (Dr. Davidson). Pt had a fairly large, dry, brown BM in PreOp just prior to surgery. Pt discussed with surgeon about waiting on this and see how things go. Pt will still have the NG in, but clamped, pt can get liquids to see how does. Surgeon to order a contrast through NG with CT to see if movement. Floor nurse aware.

## 2022-02-14 NOTE — PROGRESS NOTES
Surgery    Patient resting in bed.  Feeling much better since NG was placed.  No pain today.  Has not passed any significant flatus.  No bowel movement.  No fevers or tachycardia.    Abdomen-soft without significant tenderness.  No rebound or guarding.    A/P  Patient with second episode of de jeremiah small bowel obstruction.  He is feeling better but has not had return of bowel function.  I offered the patient options including performing a contrast challenge with follow-up in a.m. versus diagnostic laparoscopy.  Given his ongoing symptoms, he would like to undergo exploratory laparoscopy today.I discussed the risks, benefits, complications including but not limited to bleeding, infection, possible injury to the bowel or ureter, possible anastomotic dehiscence, possible postoperative MI, PE, or other anesthetic complications. If one of these complications did arise the patient may require additional procedures. The patient thoroughly understood the conversation and will sign consent.     Homero Sandoval M.D.  [facility]  [phone]    TIME SPENT:  30 minutes was spent on the date of this encounter doing chart and test result review, patient visit including physical exam, patient education, developing plan of care, and documenting.

## 2022-02-14 NOTE — PROVIDER NOTIFICATION
MD Sandoval paged regarding Xray time frame following gastroview administration.  Per MD, Clamp tube for 2 hours following gastroview administration.  Keep clamped unless not tolerating.  X ray to follow in the morning.

## 2022-02-14 NOTE — ANESTHESIA PREPROCEDURE EVALUATION
Anesthesia Pre-Procedure Evaluation    Patient: Ede Wu   MRN: [id number] : 1985        Preoperative Diagnosis: Small bowel obstruction (H) [K56.609]    Procedure : Procedure(s):  LAPAROSCOPY, DIAGNOSTIC, BY GENERAL SURGERY  LAPAROSCOPIC COLECTOMY          Past Medical History:   Diagnosis Date     Acne hypertension     Alcohol abuse      Allergic rhinitis      Allergic rhinitis, cause unspecified      Anxiety      Broken nose      Depression      Difficulty breathing      GERD (gastroesophageal reflux disease)      Heartburn      Personality disorder (H)     borderline narcissistic and antisocial traits     Schizoaffective disorder     bipolor type     Sleep apnea      Sneezing      Snoring      Stuffy nose      Weakness       Past Surgical History:   Procedure Laterality Date     nasal reconstruction      broken nose     NOSE SURGERY      several septoplasties and nasal reconstruction     TX REPAIR NASAL CAVITY STENOSIS N/A 10/5/2018    Procedure: NASAL VALVE RECONSTRUCTION, BILATERAL SUBMUCOUS RESECTION OF INFERIOR TURBINATES, AND SEPTOPLASTY WITH CADAVER RIB GRAFT SINUS ENDOSCOPY;  Surgeon: Freddy Tillman MD;  Location: [facility];  Service: ENT     SEPTOPLASTY  ,     deviated septum     SINUS SURGERY N/A 10/5/2018    Procedure: ENDOSCOPY, NOSE SINUS CAVITY, ;  Surgeon: Freddy Tillman MD;  Location: [facility] OR;  Service:       Allergies   Allergen Reactions     Dust Mites      Haldol [Butyrophenones]      Unknown [No Clinical Screening - See Comments]      prolixen      Social History     Tobacco Use     Smoking status: Current Some Day Smoker     Last attempt to quit: 2005     Years since quittin.3     Smokeless tobacco: Never Used   Substance Use Topics     Alcohol use: No      Wt Readings from Last 1 Encounters:   22 104 kg (229 lb 4.5 oz)        Prior to Admission medications    Medication Sig Start Date End Date Taking? Authorizing Provider    atenolol (TENORMIN) 50 MG tablet Take 1 tablet by mouth 2 times daily    Yes Reported, Patient   benztropine (COGENTIN) 0.5 MG tablet Take 0.5 mg by mouth At Bedtime    Yes Reported, Patient   busPIRone HCl (BUSPAR) 30 MG tablet Take 30 mg by mouth 2 times daily In AM and at 3pm   Yes Unknown, Entered By History   cetirizine (ZYRTEC) 10 MG tablet Take 10 mg by mouth At Bedtime    Yes Unknown, Entered By History   clozapine (CLOZARIL) 100 MG tablet Take 300 mg by mouth every morning    Yes Reported, Patient   cloZAPine (CLOZARIL) 200 MG tablet Take 600 mg by mouth At Bedtime   Yes Unknown, Entered By History   escitalopram (LEXAPRO) 20 MG tablet Take 20 mg by mouth daily    Yes Reported, Patient   famotidine (PEPCID) 20 MG tablet Take 20 mg by mouth 2 times daily   Yes Unknown, Entered By History   finasteride (PROSCAR) 5 MG tablet Take 1.25 mg by mouth daily    Yes Unknown, Entered By History   gemfibrozil (LOPID) 600 MG tablet Take 600 mg by mouth 2 times daily (before meals)   Yes Unknown, Entered By History   metFORMIN (GLUCOPHAGE) 1000 MG tablet Take 1,000 mg by mouth 2 times daily (with meals)   Yes Unknown, Entered By History   perphenazine 8 MG tablet Take 8 mg by mouth 3 times daily    Yes Reported, Patient   senna (SENOKOT) 8.6 MG tablet Take 2 tablets by mouth At Bedtime    Yes Unknown, Entered By History   topiramate (TOPAMAX) 100 MG tablet Take 100 mg by mouth 2 times daily    Yes Reported, Patient   vitamin D2 (ERGOCALCIFEROL) 28526 units (1250 mcg) capsule Take 50,000 Units by mouth once a week   Yes Unknown, Entered By History   albuterol (PROAIR HFA/PROVENTIL HFA/VENTOLIN HFA) 108 (90 Base) MCG/ACT inhaler Inhale 2 puffs into the lungs every 4 hours as needed for shortness of breath / dyspnea or wheezing    Unknown, Entered By History   diazepam (VALIUM) 10 MG tablet Take 10 mg by mouth 2 times daily as needed.    Reported, Patient   erythromycin with ethanol (EMGEL) 2 % gel Apply  topically 2  times daily as needed.    Reported, Patient   esomeprazole (NEXIUM) 40 MG capsule Take 40 mg by mouth every morning (before breakfast). One hour before meals    Reported, Patient   fluticasone (FLONASE) 50 MCG/ACT nasal spray 2 sprays by Both Nostrils route 2 times daily.    Reported, Patient   FLUTICASONE PROPIONATE  HFA IN Inhale  into the lungs 2 times daily.    Reported, Patient   hydrOXYzine (ATARAX) 25 MG tablet Take 25 mg by mouth 3 times daily as needed for itching    Unknown, Entered By History   loratadine (CLARITIN) 10 MG tablet Take 10 mg by mouth daily.    Reported, Patient   pantoprazole (PROTONIX) 40 MG EC tablet Take 40 mg by mouth daily    Unknown, Entered By History   POLYETHYLENE GLYCOL As directed    Reported, Patient   tetracycline (ACHROMYCIN,SUMYCIN) 250 MG capsule Take 250 mg by mouth 2 times daily.    Reported, Patient   ziprasidone (GEODON) 40 MG capsule Take 2 capsules by mouth daily.    Reported, Patient   ziprasidone (GEODON) 80 MG capsule Take 80 mg by mouth 3 times daily.    Reported, Patient     Recent Results (from the past 744 hour(s))   US Abdomen Limited (RUQ)    Narrative    EXAM: US ABDOMEN LIMITED  LOCATION: [facility]  DATE/TIME: 2/13/2022 6:57 AM    INDICATION: abd pain, n v  COMPARISON: None.  TECHNIQUE: Limited abdominal ultrasound.    FINDINGS:    GALLBLADDER: The gallbladder fundus is partially obscured by shadowing off of the patient's ribs. No definite shadowing stones are seen in the gallbladder. The wall of the gallbladder measures 3-4 mm. The patient was not focally tender over the   gallbladder. No pericholecystic fluid is seen.    BILE DUCTS: No intrahepatic biliary dilatation. The common duct is not seen secondary to overlying bowel gas.    LIVER: The left lobe of the liver is incompletely seen secondary to shadowing off of the patient's ribs and adjacent bowel gas. Visualized portions of the liver are homogeneous without focal mass.  Smooth hepatic contour. The liver measures 16.5 cm in   length.    RIGHT KIDNEY: The right kidney measures 12 cm. Renal cortical thickness measurement is 1.6 cm. No hydronephrosis.    PANCREAS: The pancreas is largely obscured by overlying gas.    No ascites.      Impression    IMPRESSION:  1.  Incomplete visualization of the fundus of the gallbladder secondary to shadowing off of the patient's ribs. No evidence to suggest cholelithiasis.       CT Abdomen Pelvis w Contrast    Narrative    EXAM: CT ABDOMEN PELVIS W CONTRAST  LOCATION: [facility]  DATE/TIME: 2/13/2022 8:07 AM    INDICATION: Abdominal pain, acute, nonlocalized  COMPARISON: None.  TECHNIQUE: CT scan of the abdomen and pelvis was performed following injection of IV contrast. Multiplanar reformats were obtained. Dose reduction techniques were used.  CONTRAST: 121mL Isovue 370    FINDINGS:   LOWER CHEST: Normal.    HEPATOBILIARY: Mild hepatic steatosis. Decompressed gallbladder without obvious opaque stones. No biliary dictation.    PANCREAS: Normal.    SPLEEN: Normal.    ADRENAL GLANDS: Normal.    KIDNEYS/BLADDER: Normal.    BOWEL: Fluid-filled distended stomach without wall thickening. Fluid distention extends into small bowel beginning at the proximal duodenum and extending through pelvic small bowel; the transition point is difficult, though may be in the right lower   quadrant located between two loops of mildly dilated bowel (series 4, image 125). Small bowel measures up to roughly 5.3 cm. Normal appendix.    LYMPH NODES: No adenopathy.    VASCULATURE: Nonaneurysmal aorta.    PELVIC ORGANS: Unremarkable.    MUSCULOSKELETAL: Small fat-containing periumbilical hernia.      Impression    IMPRESSION:     1.  Small bowel obstruction. The transition point is difficult to visualize, though may be in the right lower quadrant.    2.  Fluid distention involves the stomach and proximal small bowel, extending into pelvic small  bowel.    3.  No free fluid or air. No abscess.           Anesthesia Evaluation   Pt has had prior anesthetic. Type: General.        ROS/MED HX  ENT/Pulmonary:     (+) sleep apnea, allergic rhinitis,     Neurologic:       Cardiovascular:       METS/Exercise Tolerance:     Hematologic:       Musculoskeletal:       GI/Hepatic: Comment: Small Bowel Obstruction - current admit    (+) GERD,     Renal/Genitourinary:       Endo:     (+) Obesity,     Psychiatric/Substance Use:     (+) psychiatric history anxiety, bipolar, depression and schizophrenia alcohol abuse     Infectious Disease:       Malignancy:       Other:               OUTSIDE LABS:  CBC:   Lab Results   Component Value Date    WBC 6.8 02/14/2022    WBC 14.1 (H) 02/13/2022    HGB 13.4 02/14/2022    HGB 16.1 02/13/2022    HCT 40.8 02/14/2022    HCT 47.5 02/13/2022     (L) 02/14/2022     02/13/2022     BMP:   Lab Results   Component Value Date     02/14/2022     02/13/2022    POTASSIUM 3.4 02/14/2022    POTASSIUM 3.6 02/13/2022    CHLORIDE 113 (H) 02/14/2022    CHLORIDE 103 02/13/2022    CO2 22 02/14/2022    CO2 23 02/13/2022    BUN 24 02/14/2022    BUN 24 02/13/2022    CR 0.99 02/14/2022    CR 0.99 02/13/2022     (H) 02/14/2022    GLC 99 02/14/2022     HEPATIC:   Lab Results   Component Value Date    ALBUMIN 4.9 02/13/2022    PROTTOTAL 8.5 02/13/2022    ALT 21 02/13/2022    AST 15 02/13/2022    ALKPHOS 117 02/13/2022    BILITOTAL 0.5 02/13/2022     OTHER:   Lab Results   Component Value Date    A1C 5.5 02/13/2022    MAGALI 8.3 (L) 02/14/2022    LIPASE 139 02/13/2022       Anesthesia Plan    ASA Status:  3, emergent       Anesthesia Type: General.     - Airway: ETT   Induction: Propofol, Intravenous, RSI.   Maintenance: Balanced.        Consents            Postoperative Care    Pain management: Multi-modal analgesia.   PONV prophylaxis: Ondansetron (or other 5HT-3), Dexamethasone or Solumedrol     Comments:                Rich Gordon  MD Sean

## 2022-02-14 NOTE — PROGRESS NOTES
SPIRITUAL HEALTH SERVICES Progress Note  [facility]     phone line request from nursing for a bible.    I brought a bible to Ede's room; he was not in, and I left it in the room for him.     I shared with nursing that it was delivered.     remains available.    Penny Dietrich  Associate     Spiritual Health Phone Line [phone]  Spiritual Health Pager [phone]

## 2022-02-15 ENCOUNTER — APPOINTMENT (OUTPATIENT)
Dept: GENERAL RADIOLOGY | Facility: CLINIC | Age: 37
DRG: 390 | End: 2022-02-15
Attending: SURGERY
Payer: MEDICARE

## 2022-02-15 VITALS
HEART RATE: 100 BPM | WEIGHT: 229.28 LBS | RESPIRATION RATE: 20 BRPM | OXYGEN SATURATION: 99 % | TEMPERATURE: 97.6 F | HEIGHT: 71 IN | SYSTOLIC BLOOD PRESSURE: 122 MMHG | BODY MASS INDEX: 32.1 KG/M2 | DIASTOLIC BLOOD PRESSURE: 77 MMHG

## 2022-02-15 LAB
ANION GAP SERPL CALCULATED.3IONS-SCNC: 5 MMOL/L (ref 3–14)
BUN SERPL-MCNC: 13 MG/DL (ref 7–30)
CALCIUM SERPL-MCNC: 8.5 MG/DL (ref 8.5–10.1)
CHLORIDE BLD-SCNC: 114 MMOL/L (ref 94–109)
CO2 SERPL-SCNC: 20 MMOL/L (ref 20–32)
CREAT SERPL-MCNC: 0.86 MG/DL (ref 0.66–1.25)
ERYTHROCYTE [DISTWIDTH] IN BLOOD BY AUTOMATED COUNT: 13.2 % (ref 10–15)
GFR SERPL CREATININE-BSD FRML MDRD: >90 ML/MIN/1.73M2
GLUCOSE BLD-MCNC: 100 MG/DL (ref 70–99)
GLUCOSE BLDC GLUCOMTR-MCNC: 123 MG/DL (ref 70–99)
GLUCOSE BLDC GLUCOMTR-MCNC: 94 MG/DL (ref 70–99)
GLUCOSE BLDC GLUCOMTR-MCNC: 96 MG/DL (ref 70–99)
HCT VFR BLD AUTO: 42.4 % (ref 40–53)
HGB BLD-MCNC: 14 G/DL (ref 13.3–17.7)
MCH RBC QN AUTO: 31.3 PG (ref 26.5–33)
MCHC RBC AUTO-ENTMCNC: 33 G/DL (ref 31.5–36.5)
MCV RBC AUTO: 95 FL (ref 78–100)
PLATELET # BLD AUTO: 122 10E3/UL (ref 150–450)
POTASSIUM BLD-SCNC: 3.3 MMOL/L (ref 3.4–5.3)
POTASSIUM BLD-SCNC: 3.5 MMOL/L (ref 3.4–5.3)
RBC # BLD AUTO: 4.48 10E6/UL (ref 4.4–5.9)
SODIUM SERPL-SCNC: 139 MMOL/L (ref 133–144)
WBC # BLD AUTO: 5.6 10E3/UL (ref 4–11)

## 2022-02-15 PROCEDURE — 36415 COLL VENOUS BLD VENIPUNCTURE: CPT | Performed by: HOSPITALIST

## 2022-02-15 PROCEDURE — 84132 ASSAY OF SERUM POTASSIUM: CPT | Performed by: HOSPITALIST

## 2022-02-15 PROCEDURE — 99231 SBSQ HOSP IP/OBS SF/LOW 25: CPT | Performed by: SURGERY

## 2022-02-15 PROCEDURE — 74018 RADEX ABDOMEN 1 VIEW: CPT

## 2022-02-15 PROCEDURE — 99239 HOSP IP/OBS DSCHRG MGMT >30: CPT | Performed by: HOSPITALIST

## 2022-02-15 PROCEDURE — 258N000003 HC RX IP 258 OP 636: Performed by: HOSPITALIST

## 2022-02-15 PROCEDURE — C9113 INJ PANTOPRAZOLE SODIUM, VIA: HCPCS | Performed by: INTERNAL MEDICINE

## 2022-02-15 PROCEDURE — 250N000011 HC RX IP 250 OP 636: Performed by: INTERNAL MEDICINE

## 2022-02-15 PROCEDURE — 85027 COMPLETE CBC AUTOMATED: CPT | Performed by: HOSPITALIST

## 2022-02-15 PROCEDURE — 250N000013 HC RX MED GY IP 250 OP 250 PS 637: Performed by: HOSPITALIST

## 2022-02-15 PROCEDURE — 250N000009 HC RX 250: Performed by: INTERNAL MEDICINE

## 2022-02-15 PROCEDURE — 82310 ASSAY OF CALCIUM: CPT | Performed by: HOSPITALIST

## 2022-02-15 RX ORDER — POTASSIUM CHLORIDE 1500 MG/1
40 TABLET, EXTENDED RELEASE ORAL ONCE
Status: COMPLETED | OUTPATIENT
Start: 2022-02-15 | End: 2022-02-15

## 2022-02-15 RX ORDER — HYDROCHLOROTHIAZIDE 12.5 MG/1
12.5 TABLET ORAL DAILY
COMMUNITY

## 2022-02-15 RX ADMIN — GEMFIBROZIL 600 MG: 600 TABLET ORAL at 15:45

## 2022-02-15 RX ADMIN — FAMOTIDINE 20 MG: 10 INJECTION, SOLUTION INTRAVENOUS at 08:45

## 2022-02-15 RX ADMIN — SODIUM CHLORIDE: 9 INJECTION, SOLUTION INTRAVENOUS at 00:41

## 2022-02-15 RX ADMIN — ZIPRASIDONE HCL 80 MG: 80 CAPSULE ORAL at 08:45

## 2022-02-15 RX ADMIN — BUSPIRONE HYDROCHLORIDE 30 MG: 15 TABLET ORAL at 15:45

## 2022-02-15 RX ADMIN — FLUTICASONE PROPIONATE 2 SPRAY: 50 SPRAY, METERED NASAL at 08:51

## 2022-02-15 RX ADMIN — FINASTERIDE 1.25 MG: 5 TABLET, FILM COATED ORAL at 08:45

## 2022-02-15 RX ADMIN — BUSPIRONE HYDROCHLORIDE 30 MG: 15 TABLET ORAL at 08:45

## 2022-02-15 RX ADMIN — ESCITALOPRAM OXALATE 20 MG: 20 TABLET ORAL at 08:45

## 2022-02-15 RX ADMIN — POTASSIUM CHLORIDE 40 MEQ: 1500 TABLET, EXTENDED RELEASE ORAL at 11:04

## 2022-02-15 RX ADMIN — PANTOPRAZOLE SODIUM 40 MG: 40 INJECTION, POWDER, FOR SOLUTION INTRAVENOUS at 08:44

## 2022-02-15 RX ADMIN — CLOZAPINE 300 MG: 200 TABLET ORAL at 08:45

## 2022-02-15 RX ADMIN — GEMFIBROZIL 600 MG: 600 TABLET ORAL at 06:48

## 2022-02-15 ASSESSMENT — ACTIVITIES OF DAILY LIVING (ADL)
ADLS_ACUITY_SCORE: 9
ADLS_ACUITY_SCORE: 7
ADLS_ACUITY_SCORE: 7
ADLS_ACUITY_SCORE: 9
ADLS_ACUITY_SCORE: 7
ADLS_ACUITY_SCORE: 7
ADLS_ACUITY_SCORE: 9
ADLS_ACUITY_SCORE: 7
ADLS_ACUITY_SCORE: 9
ADLS_ACUITY_SCORE: 7
ADLS_ACUITY_SCORE: 9
ADLS_ACUITY_SCORE: 7
ADLS_ACUITY_SCORE: 7
ADLS_ACUITY_SCORE: 9
ADLS_ACUITY_SCORE: 9

## 2022-02-15 NOTE — PLAN OF CARE
A&O x4, VSS, RA, independent , Clear liquid diet, NG clamped and pt is tolerating well, infusing  ml/hr,denies nausea or vomiting, denies pain and appears comfortable, X-ray at 0600.

## 2022-02-15 NOTE — DISCHARGE SUMMARY
[facility]  Hospitalist Discharge Summary      Date of Admission:  2/13/2022  Date of Discharge:  2/15/2022  Discharging Provider: Jovana Vila MD  Discharge Service: Hospitalist Service    Discharge Diagnoses     small bowel obstruction - resolved   Hypokalemia-resolved     Follow-ups Needed After Discharge   Follow-up Appointments     Follow-up and recommended labs and tests       Follow up with primary care provider, Physician No Ref-Primary, within 7   days for hospital follow- up.  The following labs/tests are recommended:    BMP .      Follow with Gastroenterology         {Additional follow-up instructions/to-do's for PCP    :    Unresulted Labs Ordered in the Past 30 Days of this Admission     No orders found from 1/14/2022 to 2/14/2022.            Discharge Disposition   Discharged to home  Condition at discharge: Stable      Hospital Course     This is a 37-year-old man with medical history which includes obesity, hypertension, obstructive sleep apnea, reflux, bipolar/schizoaffective disorder who presented to the hospital with a chief complaint of nausea, vomiting and abdominal discomfort.  On presentation to the ER he had work-up done and his CT scan was consistent with small bowel obstruction and general surgery was consulted and patient had NG tube placed and that he was started on IV fluids.    During the course of hospital stay patient clinical condition improved and he had a bowel movement and he passed the Gastrografin challenge and patient NG tube was removed and that he was started on a low fiber diet and dietitian was consulted for educating him on the importance of tolerating a low fiber diet for 1 week.    He also had low potassium which was replaced.  On day of discharge patient's mom and her boyfriend visited and patient gave permission to talk with them and I also told the patient that surgery team recommends that her GI should see him as outpatient for  consideration of colonoscopy with intubation of TI given second SBO without history of prior surgeries.  I did place outpatient referral for GI near his area os residence.     Patient was comfortable going home and his other home medications were restarted.        Consultations This Hospital Stay   SURGERY GENERAL IP CONSULT  NUTRITION SERVICES ADULT IP CONSULT    Code Status   Full Code    Time Spent on this Encounter   IJovana MD, personally saw the patient today and spent greater than 30 minutes discharging this patient.       Jovana Vila MD  [facility] SURGERY  [facility] [address]  Phone: [phone]  Fax: [phone]  ______________________________________________________________________    Physical Exam   Vital Signs: Temp: 97.6  F (36.4  C) Temp src: Oral BP: 122/77 Pulse: 100   Resp: 20 SpO2: 99 % O2 Device: None (Room air)    Weight: 229 lbs 4.45 oz      GEN: He is alert oriented x3 and is not in any distress but was anxious  HEENT:  Normocephalic/atraumatic, no scleral icterus, no nasal discharge, mouth moist.  CV:  Regular rate and rhythm, no murmur or JVD.  S1 + S2 are normal.  LUNGS:   There is no rales/rhonchi/wheezing/retractions.    And lungs clear to auscultation  ABD:  faint bowel sounds, soft, mild epigastric tenderness, mildly distended.  No guarding/rigidity.  EXT:  No edema.  No cyanosis.  No acute joint synovitis noted.  SKIN: No rash is appreciated.  NEURO:  Moves extremities well on general exam, sensation to touch grossly intact.    No facial droop  Musculoskeletal: Normal range of motion over upper and lower extremities bilaterally  Lines : peripheral iv        Primary Care Physician   Physician No Ref-Primary    Discharge Orders      Adult Gastro Ref - Consult Only      Reason for your hospital stay    Small bowel obstruction     Follow-up and recommended labs and tests     Follow up with primary care provider, Physician No  Ref-Primary, within 7 days for hospital follow- up.  The following labs/tests are recommended:  BMP .      Follow with Gastroenterology     Activity    Your activity upon discharge: activity as tolerated     Diet    Follow this diet upon discharge: Patient is to follow low fiber diet for 1 week at discharge       Significant Results and Procedures   Results for orders placed or performed during the hospital encounter of 02/13/22   US Abdomen Limited (RUQ)    Narrative    EXAM: US ABDOMEN LIMITED  LOCATION: [facility]  DATE/TIME: 2/13/2022 6:57 AM    INDICATION: abd pain, n v  COMPARISON: None.  TECHNIQUE: Limited abdominal ultrasound.    FINDINGS:    GALLBLADDER: The gallbladder fundus is partially obscured by shadowing off of the patient's ribs. No definite shadowing stones are seen in the gallbladder. The wall of the gallbladder measures 3-4 mm. The patient was not focally tender over the   gallbladder. No pericholecystic fluid is seen.    BILE DUCTS: No intrahepatic biliary dilatation. The common duct is not seen secondary to overlying bowel gas.    LIVER: The left lobe of the liver is incompletely seen secondary to shadowing off of the patient's ribs and adjacent bowel gas. Visualized portions of the liver are homogeneous without focal mass. Smooth hepatic contour. The liver measures 16.5 cm in   length.    RIGHT KIDNEY: The right kidney measures 12 cm. Renal cortical thickness measurement is 1.6 cm. No hydronephrosis.    PANCREAS: The pancreas is largely obscured by overlying gas.    No ascites.      Impression    IMPRESSION:  1.  Incomplete visualization of the fundus of the gallbladder secondary to shadowing off of the patient's ribs. No evidence to suggest cholelithiasis.       CT Abdomen Pelvis w Contrast    Narrative    EXAM: CT ABDOMEN PELVIS W CONTRAST  LOCATION: [facility]  DATE/TIME: 2/13/2022 8:07 AM    INDICATION: Abdominal pain, acute,  nonlocalized  COMPARISON: None.  TECHNIQUE: CT scan of the abdomen and pelvis was performed following injection of IV contrast. Multiplanar reformats were obtained. Dose reduction techniques were used.  CONTRAST: 121mL Isovue 370    FINDINGS:   LOWER CHEST: Normal.    HEPATOBILIARY: Mild hepatic steatosis. Decompressed gallbladder without obvious opaque stones. No biliary dictation.    PANCREAS: Normal.    SPLEEN: Normal.    ADRENAL GLANDS: Normal.    KIDNEYS/BLADDER: Normal.    BOWEL: Fluid-filled distended stomach without wall thickening. Fluid distention extends into small bowel beginning at the proximal duodenum and extending through pelvic small bowel; the transition point is difficult, though may be in the right lower   quadrant located between two loops of mildly dilated bowel (series 4, image 125). Small bowel measures up to roughly 5.3 cm. Normal appendix.    LYMPH NODES: No adenopathy.    VASCULATURE: Nonaneurysmal aorta.    PELVIC ORGANS: Unremarkable.    MUSCULOSKELETAL: Small fat-containing periumbilical hernia.      Impression    IMPRESSION:     1.  Small bowel obstruction. The transition point is difficult to visualize, though may be in the right lower quadrant.    2.  Fluid distention involves the stomach and proximal small bowel, extending into pelvic small bowel.    3.  No free fluid or air. No abscess.       XR Abdomen 1 View    Narrative    EXAM: ABDOMEN 2 VIEWS  LOCATION: [facility]  DATE/TIME: 2/15/2022 6:15 AM    INDICATION: Small bowel obstruction. Contrast challenge.  COMPARISON: 02/13/2022 - CT abdomen and pelvis.    FINDINGS: Gas and a small to moderate amount of enteric contrast material that had been administered are present within a nondilated colon, all the way to the rectum. A paucity of gas is present in the small bowel, limiting its evaluation. There are no   visualized dilated loops of small bowel. An enteric drainage tube is present with distal tip in the  gastric body.      Impression    IMPRESSION: No convincing evidence of small bowel obstruction. Enteric contrast material that had been administered is present throughout the colon.                      Discharge Medications   Current Discharge Medication List      CONTINUE these medications which have NOT CHANGED    Details   atenolol (TENORMIN) 50 MG tablet Take 1 tablet by mouth 2 times daily       benztropine (COGENTIN) 0.5 MG tablet Take 0.5 mg by mouth At Bedtime       busPIRone HCl (BUSPAR) 30 MG tablet Take 30 mg by mouth 2 times daily In AM and at 3pm      cetirizine (ZYRTEC) 10 MG tablet Take 10 mg by mouth At Bedtime       !! clozapine (CLOZARIL) 100 MG tablet Take 300 mg by mouth every morning       !! cloZAPine (CLOZARIL) 200 MG tablet Take 600 mg by mouth At Bedtime      escitalopram (LEXAPRO) 20 MG tablet Take 20 mg by mouth daily       famotidine (PEPCID) 20 MG tablet Take 20 mg by mouth 2 times daily      finasteride (PROSCAR) 5 MG tablet Take 1.25 mg by mouth daily       fluticasone (FLONASE) 50 MCG/ACT nasal spray 2 sprays by Both Nostrils route 2 times daily.      gemfibrozil (LOPID) 600 MG tablet Take 600 mg by mouth 2 times daily (before meals)      hydrochlorothiazide (HYDRODIURIL) 12.5 MG tablet Take 12.5 mg by mouth daily      hydrOXYzine (ATARAX) 25 MG tablet Take 25 mg by mouth 3 times daily as needed for itching      metFORMIN (GLUCOPHAGE) 1000 MG tablet Take 1,000 mg by mouth 2 times daily (with meals)      pantoprazole (PROTONIX) 40 MG EC tablet Take 40 mg by mouth daily      perphenazine 8 MG tablet Take 8 mg by mouth 3 times daily       senna (SENOKOT) 8.6 MG tablet Take 2 tablets by mouth At Bedtime       topiramate (TOPAMAX) 100 MG tablet Take 100 mg by mouth 2 times daily       vitamin D2 (ERGOCALCIFEROL) 50168 units (1250 mcg) capsule Take 50,000 Units by mouth once a week       !! - Potential duplicate medications found. Please discuss with provider.      STOP taking these  medications       albuterol (PROAIR HFA/PROVENTIL HFA/VENTOLIN HFA) 108 (90 Base) MCG/ACT inhaler Comments:   Reason for Stopping:         ziprasidone (GEODON) 40 MG capsule Comments:   Reason for Stopping:             Allergies   Allergies   Allergen Reactions     Dust Mites      Haldol [Butyrophenones]      Prolixin [Fluphenazine]

## 2022-02-15 NOTE — PROGRESS NOTES
[facility]    General Surgery  Daily Progress Note       Assessment and Plan:   Ede Wu is a 37 year old male with de jeremiah small bowel obstruction. Second episode of this, resolved with nonoperative management.    PLAN:  - Passed gastrografin challenge. NG removed.  - Advance diet as tolerated to low fiber diet.  - Dietitian consulted to discuss low fiber diet for 1 week at discharge.  - Ambulate QID. Encourage deep breathe and IS.  - Other cares per primary team.    DISPOSITION:  - May discharge today if tolerates low fiber diet.  - Recommend outpatient GI consultation for consideration of colonoscopy with intubation of TI given second SBO without history of prior surgeries.        Interval History:   Ede Wu is seen on surgical rounds. He had a BM yesterday morning and exploratory laparoscopy was cancelled. He had gastrografin challenge instead and had several BM's following this. Abdominal film this morning without evidence of SBO. He denies nausea overnight with NG clamped. Started on clears. No abdominal pain or bloating. Vitals wnl.         Physical Exam:   Temp: 97.5  F (36.4  C) Temp src: Oral BP: 106/66 Pulse: 81   Resp: 16 SpO2: 100 % O2 Device: None (Room air)      I/O last 3 completed shifts:  In: 4153 [P.O.:600; I.V.:3553]  Out: 200 [Emesis/NG output:200]    GENERAL: VS reviewed, alert, oriented, no acute distress  LUNGS: Normal respiratory effort, no wheezing  ABDOMEN:  Soft, nontender, non-distended and good bowel sounds  EXTREMITIES: Moving all extremities, no gross deformities, well perfused, no lower extremity edema or tenderness  NEUROLOGICAL: Grossly non-focal, mood & affect appropriate    ABDOMINAL XR:    FINDINGS: Gas and a small to moderate amount of enteric contrast material that had been administered are present within a nondilated colon, all the way to the rectum. A paucity of gas is present in the small bowel, limiting its evaluation. There are no    visualized dilated loops of small bowel. An enteric drainage tube is present with distal tip in the gastric body.                                                             IMPRESSION: No convincing evidence of small bowel obstruction. Enteric contrast material that had been administered is present throughout the colon.        Data   Recent Labs   Lab 02/15/22  0733 02/14/22  2129 02/14/22  1735 02/14/22  1121 02/14/22  0825 02/13/22  1456 02/13/22  0525   WBC  --   --   --   --  6.8  6.8  --  14.1*   HGB  --   --   --   --  13.4  --  16.1   MCV  --   --   --   --  95  --  89   PLT  --   --   --   --  108*  --  187   NA  --   --   --   --  140  --  137   POTASSIUM  --   --   --   --  3.4  --  3.6   CHLORIDE  --   --   --   --  113*  --  103   CO2  --   --   --   --  22  --  23   BUN  --   --   --   --  24  --  24   CR  --   --   --   --  0.99  --  0.99   ANIONGAP  --   --   --   --  5  --  11   MAGALI  --   --   --   --  8.3*  --  10.3*   GLC 94 112* 86   < > 101*   < > 183*   ALBUMIN  --   --   --   --   --   --  4.9   PROTTOTAL  --   --   --   --   --   --  8.5   BILITOTAL  --   --   --   --   --   --  0.5   ALKPHOS  --   --   --   --   --   --  117   ALT  --   --   --   --   --   --  21   AST  --   --   --   --   --   --  15    < > = values in this interval not displayed.       Anyi Hanks PA-C    15 minutes spent on date of the encounter doing patient visit, chart review, and documentation.

## 2022-02-15 NOTE — PLAN OF CARE
2/14/22, [id number]   A&Ox4. CMS intact. Bowel sounds hypoactive, +flatus, 2x BM watery. tolerating Clear liquid diet. VSS on RA. NGT clamped. Up IND. PIV infusing NS @ 100mL/hr. Zofran given for nausea. Denies pain. Pt scoring green on the Aggression Stop Light Tool. Plan for x-ray tomorrow @ 6 AM, current care continued. Discharge pending.

## 2022-02-15 NOTE — PROGRESS NOTES
[facility]    General Surgery  Short Progress Note    Spoke with nurse regarding patient oral intake. He is tolerating low fiber diet and has been seen by dietitian for education on discharge for this.     Okay for discharge home from surgical standpoint after replacement of potassium. General Surgery will sign off, please call if questions or concerns.    - Patient is to follow low fiber diet for 1 week at discharge.  - Recommend outpatient GI consultation for consideration of colonoscopy with intubation of TI given second SBO without history of prior surgeries.    Anyi Hanks PA-C

## 2022-02-15 NOTE — PHARMACY-ADMISSION MEDICATION HISTORY
Pharmacy Medication History  Admission medication history completed with dispensing pharmacy.  Med list reflect medications currently being dispensed by [facility].    Prior to Admission medications    Medication Sig Last Dose Taking? Auth Provider   atenolol (TENORMIN) 50 MG tablet Take 1 tablet by mouth 2 times daily  Past Week at Unknown time Yes Reported, Patient   benztropine (COGENTIN) 0.5 MG tablet Take 0.5 mg by mouth At Bedtime  Past Week at Unknown time Yes Reported, Patient   busPIRone HCl (BUSPAR) 30 MG tablet Take 30 mg by mouth 2 times daily In AM and at 3pm Past Week at Unknown time Yes Unknown, Entered By History   cetirizine (ZYRTEC) 10 MG tablet Take 10 mg by mouth At Bedtime  Past Week at Unknown time Yes Unknown, Entered By History   clozapine (CLOZARIL) 100 MG tablet Take 300 mg by mouth every morning  Past Week at Unknown time Yes Reported, Patient   cloZAPine (CLOZARIL) 200 MG tablet Take 600 mg by mouth At Bedtime Past Week at Unknown time Yes Unknown, Entered By History   escitalopram (LEXAPRO) 20 MG tablet Take 20 mg by mouth daily  Past Week at Unknown time Yes Reported, Patient   famotidine (PEPCID) 20 MG tablet Take 20 mg by mouth 2 times daily Past Week at Unknown time Yes Unknown, Entered By History   finasteride (PROSCAR) 5 MG tablet Take 1.25 mg by mouth daily  Past Week at Unknown time Yes Unknown, Entered By History   fluticasone (FLONASE) 50 MCG/ACT nasal spray 2 sprays by Both Nostrils route 2 times daily. Past Week at Unknown time Yes Reported, Patient   gemfibrozil (LOPID) 600 MG tablet Take 600 mg by mouth 2 times daily (before meals) Past Week at Unknown time Yes Unknown, Entered By History   hydrochlorothiazide (HYDRODIURIL) 12.5 MG tablet Take 12.5 mg by mouth daily Past Week at Unknown time Yes Unknown, Entered By History   hydrOXYzine (ATARAX) 25 MG tablet Take 25 mg by mouth 3 times daily as needed for itching Past Week at Unknown time Yes Unknown, Entered  By History   metFORMIN (GLUCOPHAGE) 1000 MG tablet Take 1,000 mg by mouth 2 times daily (with meals) Past Week at Unknown time Yes Unknown, Entered By History   pantoprazole (PROTONIX) 40 MG EC tablet Take 40 mg by mouth daily Past Week at Unknown time Yes Unknown, Entered By History   perphenazine 8 MG tablet Take 8 mg by mouth 3 times daily  Past Week at Unknown time Yes Reported, Patient   senna (SENOKOT) 8.6 MG tablet Take 2 tablets by mouth At Bedtime  Past Week at Unknown time Yes Unknown, Entered By History   topiramate (TOPAMAX) 100 MG tablet Take 100 mg by mouth 2 times daily  Past Week at Unknown time Yes Reported, Patient   vitamin D2 (ERGOCALCIFEROL) 87504 units (1250 mcg) capsule Take 50,000 Units by mouth once a week Past Week at Unknown time Yes Unknown, Entered By History       The information provided in this note is only as accurate as the sources available at the time of update(s)

## 2022-02-15 NOTE — PLAN OF CARE
A&Ox4. VSS on RA. NG tube removed by surgeon today. Diet advanced to low fiber & tolerating well w/ good intake. BG checks WNL. Denies pain/nausea. BS active, +gas. Up independently, ambulates halls frequently. PIV SL. Potassium 3.3, replaced & recheck 3.5. Plan for discharge home today & follow-up with GI outpatient.

## 2022-02-15 NOTE — CONSULTS
NUTRITION EDUCATION      REASON FOR ASSESSMENT:  Nutrition education on Low Residue Diet      NUTRITION HISTORY:  Full nutrition hx deferred at this time. Pt states he is vaguely familiar with fiber and fiber content of foods. Likes fruity and chocolate elva cereal and [employer] cheeseburgers and asked frequently if these items were OK to have on low fiber diet. No known food allergies noted.     CURRENT DIET ORDER:  Low Fiber     NUTRITION DIAGNOSIS:   Food- and nutrition-related knowledge deficit R/t no prior low fiber diet education as evidenced by pt report and need for overview at bedside this morning     INTERVENTIONS:  Nutrition Prescription:    Recommended Low Residue diet per Colorectal Surgery      Implementation:    Nutrition Education (Content):  o Reviewed diet guidelines  o Provided diet handout:  - Low Fiber Nutrition Therapy    Nutrition Education (Application):  o Discussed current eating habits and recommended alternative food choice    Anticipate good compliance    Diet Education  -  refer to Education Flowsheet    Goals:    Patient verbalizes understanding of diet by stating 3 foods high in fiber to avoid for the next 7 days and 3 foods low in fiber appropriate on current diet     All of the above goals are met during diet education session    Follow Up/Monitoring:    Provide RD contact information for future questions.    MD to provide information on duration of diet once patient discharged      Angela Jara RD, LD

## 2022-02-16 ENCOUNTER — PATIENT OUTREACH (OUTPATIENT)
Dept: CARE COORDINATION | Facility: CLINIC | Age: 37
End: 2022-02-16
Payer: MEDICARE

## 2022-02-16 DIAGNOSIS — Z71.89 OTHER SPECIFIED COUNSELING: ICD-10-CM

## 2022-02-16 NOTE — PROGRESS NOTES
Patient discharged at 6:29 PM to home. IV was discontinued. Denied pain at time of discharge. Belongings returned to patient.  Discharge instructions and medications reviewed with patient.  Patient verbalized understanding and all questions were answered. At time of discharge, patient condition was stable and left the unit walking w/ RN.

## 2022-03-03 PROCEDURE — 88305 TISSUE EXAM BY PATHOLOGIST: CPT | Mod: TC,ORL | Performed by: INTERNAL MEDICINE

## 2022-03-03 PROCEDURE — 88305 TISSUE EXAM BY PATHOLOGIST: CPT | Performed by: PATHOLOGY

## 2022-03-04 ENCOUNTER — LAB REQUISITION (OUTPATIENT)
Dept: LAB | Facility: CLINIC | Age: 37
End: 2022-03-04
Payer: MEDICARE

## 2022-03-04 DIAGNOSIS — K92.1 MELENA: ICD-10-CM

## 2022-03-04 DIAGNOSIS — K64.4 RESIDUAL HEMORRHOIDAL SKIN TAGS: ICD-10-CM

## 2022-03-04 DIAGNOSIS — K64.8 OTHER HEMORRHOIDS: ICD-10-CM

## 2022-03-04 DIAGNOSIS — K62.1 RECTAL POLYP: ICD-10-CM

## 2022-03-07 LAB
PATH REPORT.COMMENTS IMP SPEC: NORMAL
PATH REPORT.COMMENTS IMP SPEC: NORMAL
PATH REPORT.FINAL DX SPEC: NORMAL
PATH REPORT.GROSS SPEC: NORMAL
PATH REPORT.MICROSCOPIC SPEC OTHER STN: NORMAL
PATH REPORT.RELEVANT HX SPEC: NORMAL
PHOTO IMAGE: NORMAL

## 2022-03-07 PROCEDURE — 88305 TISSUE EXAM BY PATHOLOGIST: CPT | Mod: 26 | Performed by: PATHOLOGY

## 2022-08-31 ENCOUNTER — HOSPITAL ENCOUNTER (EMERGENCY)
Facility: CLINIC | Age: 37
Discharge: HOME | End: 2022-08-31
Attending: EMERGENCY MEDICINE | Admitting: EMERGENCY MEDICINE
Payer: MEDICARE

## 2022-08-31 VITALS
OXYGEN SATURATION: 98 % | HEIGHT: 71 IN | DIASTOLIC BLOOD PRESSURE: 78 MMHG | WEIGHT: 214.4 LBS | HEART RATE: 88 BPM | RESPIRATION RATE: 20 BRPM | TEMPERATURE: 98 F | SYSTOLIC BLOOD PRESSURE: 117 MMHG | BODY MASS INDEX: 30.02 KG/M2

## 2022-08-31 DIAGNOSIS — M54.42 LEFT-SIDED LOW BACK PAIN WITH LEFT-SIDED SCIATICA, UNSPECIFIED CHRONICITY: ICD-10-CM

## 2022-08-31 PROCEDURE — 99284 EMERGENCY DEPT VISIT MOD MDM: CPT

## 2022-08-31 RX ORDER — LIDOCAINE 50 MG/G
1 PATCH TOPICAL EVERY 24 HOURS
Qty: 10 PATCH | Refills: 0 | Status: SHIPPED | OUTPATIENT
Start: 2022-08-31 | End: 2022-09-10

## 2022-08-31 RX ORDER — METHYLPREDNISOLONE 4 MG
TABLET, DOSE PACK ORAL
Qty: 21 TABLET | Refills: 0 | Status: SHIPPED | OUTPATIENT
Start: 2022-08-31

## 2022-08-31 RX ORDER — NAPROXEN 500 MG/1
500 TABLET ORAL 2 TIMES DAILY WITH MEALS
Qty: 16 TABLET | Refills: 0 | Status: SHIPPED | OUTPATIENT
Start: 2022-08-31 | End: 2022-09-08

## 2022-08-31 ASSESSMENT — ENCOUNTER SYMPTOMS
DYSURIA: 0
MYALGIAS: 1
BACK PAIN: 1
FEVER: 0

## 2022-08-31 NOTE — ED TRIAGE NOTES
"Reports he has sciatica to left side. Starts in left buttock and moves down left leg. Patient did not take anything for pain this morning or \"ever.\"     Triage Assessment     Row Name 08/31/22 1009       Triage Assessment (Adult)    Airway WDL WDL       Respiratory WDL    Respiratory WDL WDL       Skin Circulation/Temperature WDL    Skin Circulation/Temperature WDL WDL       Cardiac WDL    Cardiac WDL WDL       Peripheral/Neurovascular WDL    Peripheral Neurovascular WDL WDL       Cognitive/Neuro/Behavioral WDL    Cognitive/Neuro/Behavioral WDL WDL              "

## 2022-08-31 NOTE — ED PROVIDER NOTES
"  History   Chief Complaint:  Back Pain       The history is provided by the patient.      Ede Wu is a 37 year old male with history of hypertension who presents with back pain. The patient states that he has been experiencing back pain that radiates into his buttocks for a few months due to his sciatica. He has not sought care for his pain until now. He denies any known history of hypertension or hyperlipidemia. He denies any fever, chest pain, or dysuria.  He denies bowel or bladder incontinence, denies any red flag symptoms including fever or IV drug use.    Review of Systems   Constitutional: Negative for fever.   Cardiovascular: Negative for chest pain.   Genitourinary: Negative for dysuria.   Musculoskeletal: Positive for back pain and myalgias (buttocks).   All other systems reviewed and are negative.    Allergies:  Dust Mite Extract  Benzyl Alcohol  Dust Mites  Haldol [Butyrophenones]  Haloperidol  Prolixin [Fluphenazine]  Sesame Seed (Diagnostic)    Medications:  Tenormin   Cogentin   Buspar   Clozaril   Lexapro   Pepcid    Proscar  Flonase   Lopid   Hydrodiuril   Atarax   Glucophage    Perphenazine   Senokot   Topamax     Past Medical History:     Obesity   Small bowel obstruction    Alcohol abuse   Anxiety   Depression   GERD  Personality disorder   Schizoaffective disorder   Sleep apnea   Hypertension   Sinusitis     Past Surgical History:    Nasal reconstruction   Repair nasal cavity stenosis   Septoplasty      Family History:    The patient denies past family history.     Social History:  Patient came from home.  Patient is unaccompanied in the ED.  PCP: No Ref-Primary, Physician     Physical Exam     Patient Vitals for the past 24 hrs:   BP Temp Temp src Pulse Resp SpO2 Height Weight   08/31/22 1006 117/78 98  F (36.7  C) Oral 88 20 98 % 1.803 m (5' 11\") 97.3 kg (214 lb 6.4 oz)       Physical Exam  General: Alert, interactive in mild distress  Head:  Scalp is atraumatic  Eyes:  The pupils are " equal, round, and reactive to light    EOM's intact    No scleral icterus  ENT:      Nose:  The external nose is normal  Ears:  External ears are normal  Mouth/Throat: The oropharynx is normal    Mucus membranes are moist       Neck:  Normal range of motion.      There is no rigidity.    Trachea is in the midline         CV:  Regular rate and rhythm    No murmur   Resp:  Breath sounds are clear bilaterally    Non-labored, no retractions or accessory muscle use      GI:  Abdomen is soft, no distension, no tenderness.       MS:  Normal strength in all 4 extremities, No midline cervical, thoracic, or lumbar tenderness. Tenderness in left buttock. Negative straight leg raise. 2+ patellar and achilles reflexes bilaterally   Skin:  Warm and dry, No rash or lesions noted.  Neuro: Strength 5/5 x4.  Sensation intact  In all 4 extremities.      GCS: 15  Psych:  Awake. Alert.  Normal affect.      Appropriate interactions.      Emergency Department Course     Emergency Department Course:       Reviewed:  I reviewed nursing notes, vitals, past medical history and Care Everywhere    Assessments:  1127 I obtained history and examined the patient as noted above.     Disposition:  The patient was discharged to home.     Impression & Plan     CMS Diagnoses: None    Medical Decision Making:  This patient presented with low back pain. Patient is well appearing with normal vitals. The patient did not sustain any trauma and has no midline spinous tenderness. Therefore x-rays are not necessary due to the low likelihood of fracture or subluxation. The patient has not had fever, chills, IV drug use, saddle anesthesia, or bowel or bladder dysfunction.  No history or symptoms of malignancy and no recent surgical intervention.  There is no clinical evidence of cauda equina syndrome, spinal epidural abscess, osteomyelitis, cord compression. No evidence of abdominal pain/tenderness or urinary symptoms and doubt referred pain from GI or   cause.    The neurovascular exam is normal and the patient's symptoms are consistent with Sciatica given radiation of pain down posterior-lateral portion of leg. The patient will be discharged with medications as below to use as directed, and advised to use OTC analgesics.  Ice or heat to the back and stretching exercises.  No heavy lifting, bending or twisting. Return if increasing pain, numbness, weakness, fever, chills, or bowel or bladder dysfunction.  They should schedule follow-up with their doctor within 3 days.    Diagnosis:    ICD-10-CM    1. Left-sided low back pain with left-sided sciatica, unspecified chronicity  M54.42        Discharge Medications:  New Prescriptions    LIDOCAINE (LIDODERM) 5 % PATCH    Place 1 patch onto the skin every 24 hours for 10 days    METHYLPREDNISOLONE (MEDROL DOSEPAK) 4 MG TABLET THERAPY PACK    Follow Package Directions    NAPROXEN (NAPROSYN) 500 MG TABLET    Take 1 tablet (500 mg) by mouth 2 times daily (with meals) for 8 days       Scribe Disclosure:  Kristian VARGAS, am serving as a scribe at 11:24 AM on 8/31/2022 to document services personally performed by Matthew Schulte MD based on my observations and the provider's statements to me.          Matthew Schulte MD  08/31/22 7910

## (undated) DEVICE — DRSG STERI STRIP 1/2X4" R1547

## (undated) DEVICE — PACK MAJOR SBA15MAFSI

## (undated) DEVICE — ENDO TROCAR SLEEVE KII Z-THREADED 05X100MM CTS02

## (undated) DEVICE — GOWN IMPERVIOUS ZONED XLG 9041

## (undated) DEVICE — SU VICRYL 3-0 SH 27" J316H

## (undated) DEVICE — EVAC SYSTEM CLEAR FLOW SC082500

## (undated) DEVICE — ESU HOLDER LAP INST DISP PURPLE LONG 330MM H-PRO-330

## (undated) DEVICE — SOL WATER IRRIG 1000ML BOTTLE 2F7114

## (undated) DEVICE — GLOVE PROTEXIS W/NEU-THERA 7.5  2D73TE75

## (undated) DEVICE — PREP CHLORAPREP 26ML TINTED ORANGE  260815

## (undated) DEVICE — DECANTER VIAL 2006S

## (undated) DEVICE — ENDO TROCAR FIRST ENTRY KII FIOS Z-THRD 05X100MM CTF03

## (undated) DEVICE — SYSTEM CLEARIFY VISUALIZATION 21-345

## (undated) DEVICE — SUCTION CANISTER MEDIVAC LINER 3000ML W/LID 65651-530

## (undated) DEVICE — SOL NACL 0.9% IRRIG 1000ML BOTTLE 2F7124

## (undated) DEVICE — BLADE KNIFE SURG 11 371111

## (undated) DEVICE — LINEN TOWEL PACK X5 5464

## (undated) DEVICE — ANTIFOG SOLUTION W/FOAM PAD 31142527

## (undated) DEVICE — ENDO TROCAR BLUNT TIP KII BALLOON 12X100MM C0R47

## (undated) DEVICE — ESU GROUND PAD UNIVERSAL W/O CORD

## (undated) DEVICE — SU VICRYL 0 UR-6 27" J603H

## (undated) DEVICE — SOL NACL 0.9% INJ 1000ML BAG 2B1324X

## (undated) DEVICE — GLOVE GAMMEX DERMAPRENE ULTRA SZ 8 LF 8516

## (undated) DEVICE — SUCTION IRR STRYKERFLOW II W/TIP 250-070-520

## (undated) DEVICE — SU MONOCRYL 4-0 PS-2 18" UND Y496G

## (undated) DEVICE — PACK LAP CHOLE SLC15LCFSD

## (undated) DEVICE — DRAPE LAP W/POUCH 9430

## (undated) DEVICE — DRSG GAUZE 4X4" 3033

## (undated) RX ORDER — FENTANYL CITRATE 50 UG/ML
INJECTION, SOLUTION INTRAMUSCULAR; INTRAVENOUS
Status: DISPENSED
Start: 2022-02-14

## (undated) RX ORDER — PROPOFOL 10 MG/ML
INJECTION, EMULSION INTRAVENOUS
Status: DISPENSED
Start: 2022-02-14

## (undated) RX ORDER — BUPIVACAINE HYDROCHLORIDE AND EPINEPHRINE 2.5; 5 MG/ML; UG/ML
INJECTION, SOLUTION EPIDURAL; INFILTRATION; INTRACAUDAL; PERINEURAL
Status: DISPENSED
Start: 2022-02-14